# Patient Record
Sex: MALE | Race: ASIAN | NOT HISPANIC OR LATINO | URBAN - METROPOLITAN AREA
[De-identification: names, ages, dates, MRNs, and addresses within clinical notes are randomized per-mention and may not be internally consistent; named-entity substitution may affect disease eponyms.]

---

## 2020-07-10 ENCOUNTER — OUTPATIENT (OUTPATIENT)
Dept: OUTPATIENT SERVICES | Facility: HOSPITAL | Age: 55
LOS: 1 days | Discharge: HOME | End: 2020-07-10

## 2020-07-10 DIAGNOSIS — C79.9 SECONDARY MALIGNANT NEOPLASM OF UNSPECIFIED SITE: ICD-10-CM

## 2020-07-10 PROBLEM — R59.0 PELVIC LYMPHADENOPATHY: Status: ACTIVE | Noted: 2020-07-10

## 2020-07-10 PROBLEM — R59.0 RETROPERITONEAL LYMPHADENOPATHY: Status: ACTIVE | Noted: 2020-07-10

## 2020-07-13 ENCOUNTER — APPOINTMENT (OUTPATIENT)
Dept: HEMATOLOGY ONCOLOGY | Facility: CLINIC | Age: 55
End: 2020-07-13
Payer: COMMERCIAL

## 2020-07-13 VITALS
BODY MASS INDEX: 34.02 KG/M2 | DIASTOLIC BLOOD PRESSURE: 102 MMHG | TEMPERATURE: 99 F | SYSTOLIC BLOOD PRESSURE: 152 MMHG | HEART RATE: 78 BPM | HEIGHT: 64.02 IN | RESPIRATION RATE: 17 BRPM | WEIGHT: 199.3 LBS | OXYGEN SATURATION: 98 %

## 2020-07-13 DIAGNOSIS — Z56.0 UNEMPLOYMENT, UNSPECIFIED: ICD-10-CM

## 2020-07-13 DIAGNOSIS — Z84.89 FAMILY HISTORY OF OTHER SPECIFIED CONDITIONS: ICD-10-CM

## 2020-07-13 DIAGNOSIS — R59.0 LOCALIZED ENLARGED LYMPH NODES: ICD-10-CM

## 2020-07-13 DIAGNOSIS — R73.03 PREDIABETES.: ICD-10-CM

## 2020-07-13 DIAGNOSIS — Z87.891 PERSONAL HISTORY OF NICOTINE DEPENDENCE: ICD-10-CM

## 2020-07-13 DIAGNOSIS — Z83.3 FAMILY HISTORY OF DIABETES MELLITUS: ICD-10-CM

## 2020-07-13 DIAGNOSIS — Z87.442 PERSONAL HISTORY OF URINARY CALCULI: ICD-10-CM

## 2020-07-13 PROCEDURE — 99205 OFFICE O/P NEW HI 60 MIN: CPT

## 2020-07-13 SDOH — ECONOMIC STABILITY - INCOME SECURITY: UNEMPLOYMENT, UNSPECIFIED: Z56.0

## 2020-07-13 NOTE — RESULTS/DATA
[FreeTextEntry1] : CT scan is dated seven 8/20 July 8, 2020, from an outside radiology center.  This was compared to a CT of the abdomen and pelvis in January 2 of 2020.  There was diffusely hypoattenuating findings in the liver consistent with steatosis.  There are a couple small and simple hemorrhagic cysts were again noted bilaterally.  There was no enhancing renal mass.  There were several lymph nodes that had developed.  These are described as multiple scattered retroperitoneal and pelvic lymph nodes which are noted to be increased in size and number.  The prior scan did not list any adenopathy.  A left periaortic lymph node measures 1 x 1.8, previously 0.6 x 1.3.  Left common iliac lymph node now measures 1.9 x 1.5, previously 1.5 x 1.3.  A right internal iliac lymph node measures 2.4 x 1.2 and the left internal iliac lymph node measures 1.9 x 1 cm.  Left obturator lymph node measures 0.8 x 1.3 cm.  There is an enhancing lesion in the anterior fibromuscular stroma of the prostate measuring 0.7 x 1.5 cm.  There is also an enhancing lesion in the left posterior lateral peripheral zone base, measuring 0.6 x 1.2 cm.  Seminal vesicles were mostly obscured by artifact from a right hip arthroplasty.  There was a sclerotic lesion in the left iliac wing measuring 4 x 6 mm.  There was a 5 mm right sacral sclerotic lesion, most likely a bone island as it was present in 2007.  There are multiple degenerative changes of the spine.\par \par CT scan from January 2 of 2020 as much detail about the renal cysts.  It says there is no adenopathy present.  PSA is available from December 30 of 2009, with a value of 1.1.  On December 12 of 2018, the PSA was 1.28 within our system.

## 2020-07-13 NOTE — REVIEW OF SYSTEMS
[Fatigue] : fatigue [Recent Change In Weight] : ~T recent weight change [Negative] : ENT [Fever] : no fever [Chills] : no chills [Palpitations] : no palpitations [Chest Pain] : no chest pain [Lower Ext Edema] : no lower extremity edema [Cough] : no cough [Abdominal Pain] : no abdominal pain [SOB on Exertion] : no shortness of breath during exertion [Constipation] : no constipation [Vomiting] : no vomiting [Diarrhea] : no diarrhea [Incontinence] : no incontinence [Dysuria] : no dysuria [Joint Pain] : no joint pain [Muscle Pain] : no muscle pain [Joint Stiffness] : no joint stiffness [Skin Rash] : no skin rash [Anxiety] : no anxiety [Dizziness] : no dizziness [Depression] : no depression [Muscle Weakness] : no muscle weakness [Easy Bleeding] : no tendency for easy bleeding [Easy Bruising] : no tendency for easy bruising [FreeTextEntry9] : no cramps [FreeTextEntry2] : mild fatigue at times [de-identified] : occ HA, right occipital region, last 2 days, relieved with Tylenol no paresthesias

## 2020-07-13 NOTE — CONSULT LETTER
[Dear  ___] : Dear  [unfilled], [Consult Letter:] : I had the pleasure of evaluating your patient, [unfilled]. [Please see my note below.] : Please see my note below. [Consult Closing:] : Thank you very much for allowing me to participate in the care of this patient.  If you have any questions, please do not hesitate to contact me. [Sincerely,] : Sincerely, [DrFranco  ___] : Dr. LI

## 2020-07-13 NOTE — REASON FOR VISIT
[Initial Consultation] : an initial consultation [Family Member] : family member [FreeTextEntry2] : prostate lesion, lymphadenopathy

## 2020-07-13 NOTE — HISTORY OF PRESENT ILLNESS
[de-identified] : Cooper Mcfadden is seen in consultation on July 13, 2020. He does not have any symptoms. He had an abdominal sono last year. He had a CT scan in January and has multiple cysts. He wanted a repeat CT in 6 months, and this one shows adenopathy, new form prior scan. there is also a lesion within the prostate. He has no pains at all. Urine flow is normal, nocturia 1-2 times.  No dysuria, no hematuria. No hesitancy, no incontinence no urgency. No leg edema. Appetite is good, no weight loss.

## 2020-07-13 NOTE — PHYSICAL EXAM
[Fully active, able to carry on all pre-disease performance without restriction] : Status 0 - Fully active, able to carry on all pre-disease performance without restriction [Normal] : affect appropriate [de-identified] : no edema [de-identified] : no gynecomastia [FreeTextEntry1] : normal size, no nodules, no induration [de-identified] : uncircumcised, glans normal, testes normal.

## 2020-07-13 NOTE — ASSESSMENT
[Palliative Care Plan] : not applicable at this time [FreeTextEntry1] : Cooper Mcfadden is seen in the office today in consultation.  He is accompanied by his daughter, Zahira, who is a physician's assistant at [employer].  He had an abdominal sonogram performed last year, and this revealed the presence of cysts in the kidney and a CT scan was performed in January of this year.  The recommendation was to have a follow-up CT scan in 6 months, and this was recently performed.  This scan shows adenopathy.  The prior scan revealed no report of adenopathy, although there was some lymph nodes present that have enlarged at this time.  On the CT images, there are at least 2 lesions in the prostate, 1 of which is in the anterior fibromuscular stroma of the prostate measuring 7 x 15 mm.  There is also an enhancing lesion in the left posterior lateral peripheral zone base measuring 6 x 12 mm.  Seminal vesicles were obscured by artifact from his prior right hip arthroplasty.  There was a sclerotic lesion in the left iliac wing measuring 4 x 6 mm and a 5 mm right sacral sclerotic lesion which was deemed most likely to be a bone island as it was present in 2007 had a size of 3 mm.  No other bony lesions were noted within the CAT scan.\par \par An MRI of the prostate and the bone scan are due to be performed in the next 1 or 2 days.  His daughter was concerned about these findings, and sought to have a consultation.  He does not have an established diagnosis at this time.\par \par A comprehensive history was obtained and a physical examination was performed.  The rectal examination was suboptimal, but what I could determine in the lower portion as I could not palpate the more cephalad portion of the gland.  I did not find any nodularity or induration.\par \par We discussed many different things.  We cannot be certain that the lymph nodes are secondary to prostate cancer.  The MRI of the prostate will give us additional information to direct his biopsy.  He will also better determine whether or not there is capsular invasion or seminal vesicle invasion.  I requested that the images be downloaded onto our system from the outside radiology facility, but that has not been done as of yet.  He had a PSA performed at Dr. Tarango's office this past week, and the results are not known.  His daughter says that his PSA was somewhere between 1 and 2 in 2019.\par \par We discussed prostate cancer in general terms, the value of the [place] score in determining risk of spread and survival, and we discussed the common sites that disease spreads to.\par \par We discussed treatment options for metastatic prostate cancer if this indeed is what the final diagnosis is found to be.  We discussed the adverse effects that may occur with androgen deprivation therapy.\par \par She had multiple questions to ask, and many could not be answered without the necessary information at this point.  I have asked her to have the radiologic studies sent to me from the outside radiology facility when they are performed, and I will speak with her regarding these results.\par \par All questions were answered to the best of my ability and to their apparent satisfaction.  More information is needed in order to formulate a comprehensive plan of action.

## 2020-07-29 DIAGNOSIS — Z01.818 ENCOUNTER FOR OTHER PREPROCEDURAL EXAMINATION: ICD-10-CM

## 2020-08-01 ENCOUNTER — APPOINTMENT (OUTPATIENT)
Dept: DISASTER EMERGENCY | Facility: CLINIC | Age: 55
End: 2020-08-01

## 2020-08-02 LAB — SARS-COV-2 N GENE NPH QL NAA+PROBE: NOT DETECTED

## 2020-08-04 ENCOUNTER — OUTPATIENT (OUTPATIENT)
Dept: OUTPATIENT SERVICES | Facility: HOSPITAL | Age: 55
LOS: 1 days | End: 2020-08-04
Payer: COMMERCIAL

## 2020-08-04 ENCOUNTER — APPOINTMENT (OUTPATIENT)
Dept: CT IMAGING | Facility: HOSPITAL | Age: 55
End: 2020-08-04

## 2020-08-04 ENCOUNTER — RESULT REVIEW (OUTPATIENT)
Age: 55
End: 2020-08-04

## 2020-08-04 DIAGNOSIS — Z00.00 ENCOUNTER FOR GENERAL ADULT MEDICAL EXAMINATION WITHOUT ABNORMAL FINDINGS: ICD-10-CM

## 2020-08-04 DIAGNOSIS — R59.0 LOCALIZED ENLARGED LYMPH NODES: ICD-10-CM

## 2020-08-04 LAB
APTT BLD: 29.3 SEC — SIGNIFICANT CHANGE UP (ref 27.5–35.5)
HCT VFR BLD CALC: 41.2 % — SIGNIFICANT CHANGE UP (ref 39–50)
HGB BLD-MCNC: 13.3 G/DL — SIGNIFICANT CHANGE UP (ref 13–17)
INR BLD: 0.99 RATIO — SIGNIFICANT CHANGE UP (ref 0.88–1.16)
MCHC RBC-ENTMCNC: 27.5 PG — SIGNIFICANT CHANGE UP (ref 27–34)
MCHC RBC-ENTMCNC: 32.3 GM/DL — SIGNIFICANT CHANGE UP (ref 32–36)
MCV RBC AUTO: 85.3 FL — SIGNIFICANT CHANGE UP (ref 80–100)
NRBC # BLD: 0 /100 WBCS — SIGNIFICANT CHANGE UP (ref 0–0)
PLATELET # BLD AUTO: 213 K/UL — SIGNIFICANT CHANGE UP (ref 150–400)
PROTHROM AB SERPL-ACNC: 11.8 SEC — SIGNIFICANT CHANGE UP (ref 10.6–13.6)
RBC # BLD: 4.83 M/UL — SIGNIFICANT CHANGE UP (ref 4.2–5.8)
RBC # FLD: 13.1 % — SIGNIFICANT CHANGE UP (ref 10.3–14.5)
WBC # BLD: 7.58 K/UL — SIGNIFICANT CHANGE UP (ref 3.8–10.5)
WBC # FLD AUTO: 7.58 K/UL — SIGNIFICANT CHANGE UP (ref 3.8–10.5)

## 2020-08-04 PROCEDURE — 77012 CT SCAN FOR NEEDLE BIOPSY: CPT | Mod: 26

## 2020-08-04 PROCEDURE — 85730 THROMBOPLASTIN TIME PARTIAL: CPT

## 2020-08-04 PROCEDURE — 88305 TISSUE EXAM BY PATHOLOGIST: CPT

## 2020-08-04 PROCEDURE — 88185 FLOWCYTOMETRY/TC ADD-ON: CPT

## 2020-08-04 PROCEDURE — 87205 SMEAR GRAM STAIN: CPT

## 2020-08-04 PROCEDURE — 38505 NEEDLE BIOPSY LYMPH NODES: CPT

## 2020-08-04 PROCEDURE — 88172 CYTP DX EVAL FNA 1ST EA SITE: CPT

## 2020-08-04 PROCEDURE — 88184 FLOWCYTOMETRY/ TC 1 MARKER: CPT

## 2020-08-04 PROCEDURE — 77012 CT SCAN FOR NEEDLE BIOPSY: CPT

## 2020-08-04 PROCEDURE — 85610 PROTHROMBIN TIME: CPT

## 2020-08-04 PROCEDURE — 85027 COMPLETE CBC AUTOMATED: CPT

## 2020-08-04 PROCEDURE — 88173 CYTOPATH EVAL FNA REPORT: CPT | Mod: 26

## 2020-08-04 PROCEDURE — 88305 TISSUE EXAM BY PATHOLOGIST: CPT | Mod: 26

## 2020-08-04 PROCEDURE — 88189 FLOWCYTOMETRY/READ 16 & >: CPT

## 2020-08-04 PROCEDURE — 88173 CYTOPATH EVAL FNA REPORT: CPT

## 2020-08-05 LAB — TM INTERPRETATION: SIGNIFICANT CHANGE UP

## 2020-08-07 LAB — NON-GYNECOLOGICAL CYTOLOGY STUDY: SIGNIFICANT CHANGE UP

## 2022-07-27 ENCOUNTER — INPATIENT (INPATIENT)
Facility: HOSPITAL | Age: 57
LOS: 1 days | Discharge: HOME | DRG: 280 | End: 2022-07-29
Attending: STUDENT IN AN ORGANIZED HEALTH CARE EDUCATION/TRAINING PROGRAM | Admitting: STUDENT IN AN ORGANIZED HEALTH CARE EDUCATION/TRAINING PROGRAM
Payer: COMMERCIAL

## 2022-07-27 VITALS
HEART RATE: 88 BPM | WEIGHT: 190.04 LBS | HEIGHT: 64 IN | OXYGEN SATURATION: 98 % | RESPIRATION RATE: 18 BRPM | DIASTOLIC BLOOD PRESSURE: 101 MMHG | TEMPERATURE: 98 F | SYSTOLIC BLOOD PRESSURE: 187 MMHG

## 2022-07-27 DIAGNOSIS — I21.4 NON-ST ELEVATION (NSTEMI) MYOCARDIAL INFARCTION: ICD-10-CM

## 2022-07-27 DIAGNOSIS — Z29.9 ENCOUNTER FOR PROPHYLACTIC MEASURES, UNSPECIFIED: ICD-10-CM

## 2022-07-27 DIAGNOSIS — E78.5 HYPERLIPIDEMIA, UNSPECIFIED: ICD-10-CM

## 2022-07-27 DIAGNOSIS — I25.10 ATHEROSCLEROTIC HEART DISEASE OF NATIVE CORONARY ARTERY WITHOUT ANGINA PECTORIS: ICD-10-CM

## 2022-07-27 DIAGNOSIS — I10 ESSENTIAL (PRIMARY) HYPERTENSION: ICD-10-CM

## 2022-07-27 LAB
ALBUMIN SERPL ELPH-MCNC: 3.9 G/DL — SIGNIFICANT CHANGE UP (ref 3.3–5)
ALP SERPL-CCNC: 67 U/L — SIGNIFICANT CHANGE UP (ref 40–120)
ALT FLD-CCNC: 30 U/L — SIGNIFICANT CHANGE UP (ref 12–78)
ANION GAP SERPL CALC-SCNC: 7 MMOL/L — SIGNIFICANT CHANGE UP (ref 5–17)
APTT BLD: 31.1 SEC — SIGNIFICANT CHANGE UP (ref 27.5–35.5)
AST SERPL-CCNC: 25 U/L — SIGNIFICANT CHANGE UP (ref 15–37)
BILIRUB SERPL-MCNC: 0.5 MG/DL — SIGNIFICANT CHANGE UP (ref 0.2–1.2)
BUN SERPL-MCNC: 12 MG/DL — SIGNIFICANT CHANGE UP (ref 7–23)
CALCIUM SERPL-MCNC: 9 MG/DL — SIGNIFICANT CHANGE UP (ref 8.5–10.1)
CHLORIDE SERPL-SCNC: 105 MMOL/L — SIGNIFICANT CHANGE UP (ref 96–108)
CK MB BLD-MCNC: 1.2 % — SIGNIFICANT CHANGE UP (ref 0–3.5)
CK MB CFR SERPL CALC: 1.2 NG/ML — SIGNIFICANT CHANGE UP (ref 0–3.6)
CK MB CFR SERPL CALC: 1.5 NG/ML — SIGNIFICANT CHANGE UP (ref 0–3.6)
CK SERPL-CCNC: 102 U/L — SIGNIFICANT CHANGE UP (ref 26–308)
CO2 SERPL-SCNC: 27 MMOL/L — SIGNIFICANT CHANGE UP (ref 22–31)
CREAT SERPL-MCNC: 0.85 MG/DL — SIGNIFICANT CHANGE UP (ref 0.5–1.3)
EGFR: 101 ML/MIN/1.73M2 — SIGNIFICANT CHANGE UP
GLUCOSE SERPL-MCNC: 125 MG/DL — HIGH (ref 70–99)
HCT VFR BLD CALC: 41.9 % — SIGNIFICANT CHANGE UP (ref 39–50)
HGB BLD-MCNC: 13.5 G/DL — SIGNIFICANT CHANGE UP (ref 13–17)
INR BLD: 1.12 RATIO — SIGNIFICANT CHANGE UP (ref 0.88–1.16)
MCHC RBC-ENTMCNC: 27 PG — SIGNIFICANT CHANGE UP (ref 27–34)
MCHC RBC-ENTMCNC: 32.2 GM/DL — SIGNIFICANT CHANGE UP (ref 32–36)
MCV RBC AUTO: 83.8 FL — SIGNIFICANT CHANGE UP (ref 80–100)
NRBC # BLD: 0 /100 WBCS — SIGNIFICANT CHANGE UP (ref 0–0)
PLATELET # BLD AUTO: 326 K/UL — SIGNIFICANT CHANGE UP (ref 150–400)
POTASSIUM SERPL-MCNC: 3.8 MMOL/L — SIGNIFICANT CHANGE UP (ref 3.5–5.3)
POTASSIUM SERPL-SCNC: 3.8 MMOL/L — SIGNIFICANT CHANGE UP (ref 3.5–5.3)
PROT SERPL-MCNC: 8.1 G/DL — SIGNIFICANT CHANGE UP (ref 6–8.3)
PROTHROM AB SERPL-ACNC: 13.1 SEC — SIGNIFICANT CHANGE UP (ref 10.5–13.4)
RBC # BLD: 5 M/UL — SIGNIFICANT CHANGE UP (ref 4.2–5.8)
RBC # FLD: 12.9 % — SIGNIFICANT CHANGE UP (ref 10.3–14.5)
SODIUM SERPL-SCNC: 139 MMOL/L — SIGNIFICANT CHANGE UP (ref 135–145)
TROPONIN I, HIGH SENSITIVITY RESULT: 27.2 NG/L — SIGNIFICANT CHANGE UP
TROPONIN I, HIGH SENSITIVITY RESULT: 95.3 NG/L — HIGH
WBC # BLD: 11.64 K/UL — HIGH (ref 3.8–10.5)
WBC # FLD AUTO: 11.64 K/UL — HIGH (ref 3.8–10.5)

## 2022-07-27 PROCEDURE — 99285 EMERGENCY DEPT VISIT HI MDM: CPT

## 2022-07-27 PROCEDURE — 99223 1ST HOSP IP/OBS HIGH 75: CPT

## 2022-07-27 PROCEDURE — 93010 ELECTROCARDIOGRAM REPORT: CPT

## 2022-07-27 PROCEDURE — 71045 X-RAY EXAM CHEST 1 VIEW: CPT | Mod: 26

## 2022-07-27 RX ORDER — METOPROLOL TARTRATE 50 MG
50 TABLET ORAL DAILY
Refills: 0 | Status: DISCONTINUED | OUTPATIENT
Start: 2022-07-27 | End: 2022-07-29

## 2022-07-27 RX ORDER — ASPIRIN/CALCIUM CARB/MAGNESIUM 324 MG
81 TABLET ORAL DAILY
Refills: 0 | Status: DISCONTINUED | OUTPATIENT
Start: 2022-07-27 | End: 2022-07-29

## 2022-07-27 RX ORDER — ACETAMINOPHEN 500 MG
650 TABLET ORAL EVERY 6 HOURS
Refills: 0 | Status: DISCONTINUED | OUTPATIENT
Start: 2022-07-27 | End: 2022-07-29

## 2022-07-27 RX ORDER — LANOLIN ALCOHOL/MO/W.PET/CERES
3 CREAM (GRAM) TOPICAL AT BEDTIME
Refills: 0 | Status: DISCONTINUED | OUTPATIENT
Start: 2022-07-27 | End: 2022-07-29

## 2022-07-27 RX ORDER — ASPIRIN/CALCIUM CARB/MAGNESIUM 324 MG
1 TABLET ORAL
Qty: 0 | Refills: 0 | DISCHARGE

## 2022-07-27 RX ORDER — HEPARIN SODIUM 5000 [USP'U]/ML
5000 INJECTION INTRAVENOUS; SUBCUTANEOUS ONCE
Refills: 0 | Status: COMPLETED | OUTPATIENT
Start: 2022-07-27 | End: 2022-07-27

## 2022-07-27 RX ORDER — HEPARIN SODIUM 5000 [USP'U]/ML
5300 INJECTION INTRAVENOUS; SUBCUTANEOUS EVERY 6 HOURS
Refills: 0 | Status: DISCONTINUED | OUTPATIENT
Start: 2022-07-27 | End: 2022-07-29

## 2022-07-27 RX ORDER — METOPROLOL TARTRATE 50 MG
1 TABLET ORAL
Qty: 0 | Refills: 0 | DISCHARGE

## 2022-07-27 RX ORDER — LOSARTAN POTASSIUM 100 MG/1
1 TABLET, FILM COATED ORAL
Qty: 0 | Refills: 0 | DISCHARGE

## 2022-07-27 RX ORDER — ASPIRIN/CALCIUM CARB/MAGNESIUM 324 MG
325 TABLET ORAL ONCE
Refills: 0 | Status: COMPLETED | OUTPATIENT
Start: 2022-07-27 | End: 2022-07-27

## 2022-07-27 RX ORDER — ATORVASTATIN CALCIUM 80 MG/1
80 TABLET, FILM COATED ORAL AT BEDTIME
Refills: 0 | Status: DISCONTINUED | OUTPATIENT
Start: 2022-07-27 | End: 2022-07-29

## 2022-07-27 RX ORDER — HEPARIN SODIUM 5000 [USP'U]/ML
INJECTION INTRAVENOUS; SUBCUTANEOUS
Qty: 25000 | Refills: 0 | Status: DISCONTINUED | OUTPATIENT
Start: 2022-07-27 | End: 2022-07-29

## 2022-07-27 RX ORDER — LOSARTAN POTASSIUM 100 MG/1
50 TABLET, FILM COATED ORAL DAILY
Refills: 0 | Status: DISCONTINUED | OUTPATIENT
Start: 2022-07-27 | End: 2022-07-29

## 2022-07-27 RX ADMIN — HEPARIN SODIUM 5000 UNIT(S): 5000 INJECTION INTRAVENOUS; SUBCUTANEOUS at 22:17

## 2022-07-27 RX ADMIN — Medication 325 MILLIGRAM(S): at 19:31

## 2022-07-27 RX ADMIN — HEPARIN SODIUM 1000 UNIT(S)/HR: 5000 INJECTION INTRAVENOUS; SUBCUTANEOUS at 22:17

## 2022-07-27 NOTE — H&P ADULT - HISTORY OF PRESENT ILLNESS
56-year-old male with past medical history of CAD with stents on aspirin(stents 2009), Hypertension, hyperlipidemia who presents with compliant of chest pain. Patient states he had constant left-sided chest pain that started at 5 PM while sitting on sofa at home.  Patient states pain was radiating down arm and neck.  Patient also complaining of some shortness of breath.  Patient denies worsening symptoms with exertion. Patient states pain has resolved upon interview.     In the ed   VS: T 98.2, HR 88, /101, RR 18, SpO2 98 on RA  Labs: WBC 11.94, troponin 27.2->95.3, glucose 125  EKG: NSR 86  CXR: clear lungs on wet read  Given asa 325, started on heparin gtt per cardiology Dr. Hankins

## 2022-07-27 NOTE — ED ADULT NURSE NOTE - NSIMPLEMENTINTERV_GEN_ALL_ED
Implemented All Fall Risk Interventions:  [place] to call system. Call bell, personal items and telephone within reach. Instruct patient to call for assistance. Room bathroom lighting operational. Non-slip footwear when patient is off stretcher. Physically safe environment: no spills, clutter or unnecessary equipment. Stretcher in lowest position, wheels locked, appropriate side rails in place. Provide visual cue, wrist band, yellow gown, etc. Monitor gait and stability. Monitor for mental status changes and reorient to person, place, and time. Review medications for side effects contributing to fall risk. Reinforce activity limits and safety measures with patient and family.

## 2022-07-27 NOTE — ED ADULT NURSE NOTE - OBJECTIVE STATEMENT
received pt c/o midsternal cp radiating to L arm for ~ 1 hour prior to ed arrival.  pt staes he was sitting prior to onset, denies recent change in medications/activity, denies recent trauma.   Respirations even and unlabored denies sob.   Skin warm and dry.   ekg obtained.  Pt safety maintained. BN

## 2022-07-27 NOTE — ED PROVIDER NOTE - PROGRESS NOTE DETAILS
2nd trop positive will admit for nstemi given heparin cardio Dr. Hankins made aware currently chest pain free

## 2022-07-27 NOTE — H&P ADULT - TIME BILLING
Home note written by attending, see above   Reviewing medical record including consultant recommendations, labs, vitals, medications, orders, imaging, and discussion of plan of care with patient.

## 2022-07-27 NOTE — H&P ADULT - NSHPREVIEWOFSYSTEMS_GEN_ALL_CORE
CONSTITUTIONAL: denies fever, chills, fatigue, weakness  HEENT: denies blurred vision, sore throat  SKIN: denies new lesions, rash  CARDIOVASCULAR: admits chest pain, chest pressure, denies palpitations  RESPIRATORY: admits mild shortness of breath, denies sputum production  GASTROINTESTINAL: denies nausea, vomiting, diarrhea, abdominal pain  GENITOURINARY: denies dysuria, discharge  NEUROLOGICAL: denies numbness, headache, focal weakness  MUSCULOSKELETAL: denies new joint pain, muscle aches  HEMATOLOGIC: denies gross bleeding, bruising  LYMPHATICS: denies enlarged lymph nodes, extremity swelling  PSYCHIATRIC: denies recent changes in anxiety, depression  ENDOCRINOLOGIC: denies sweating, cold or heat intolerance

## 2022-07-27 NOTE — H&P ADULT - ASSESSMENT
56-year-old male with past medical history of CAD with stents on aspirin(stents 2009), Hypertension, hyperlipidemia who presents with compliant of chest pain. Admitted with NSTEMI.

## 2022-07-27 NOTE — ED PROVIDER NOTE - CLINICAL SUMMARY MEDICAL DECISION MAKING FREE TEXT BOX
DT: I have personally performed a face to face diagnostic evaluation on this patient.  I have reviewed the PA's note and agree with the history, exam, and plan of care, except as noted.  History and Exam by me shows 56-year-old male with past medical history of CAD with stents and Hypertension hyperlipidemia on aspirin complaining of constant left-sided chest pain that started at 5 PM while sitting on sofa at home.  Patient states pain radiating arm and neck.  Patient also complaining of some shortness of breath.  Patient denies worsening symptoms with exertion..  Patient is NAD.  A n O x 3. Head NC/AT. Lungs cta bl. Heart s1,s2, rrr, no murmurs. Abd-soft, nt, no guarding, no rebound, no distension, no cva tenderness. Ext- FROM actively,  ambulating s any difficulty.  Labs and cxr were sig for nstemi, but pt has no cp during me eval.

## 2022-07-27 NOTE — H&P ADULT - PROBLEM SELECTOR PLAN 1
- Admit to tele  - Trop I elevated, EKG as above  - Continue heparin gtt  - continue home asa, interchange for home statin, home bb  - trend troponin to peak   - F/U AM lipid panel  - Echo  - DASH diet  - Cardiology Dr. Hankins consulted

## 2022-07-27 NOTE — ED PROVIDER NOTE - NS ED ATTENDING STATEMENT MOD
This was a shared visit with the KATHRYN. I reviewed and verified the documentation and independently performed the documented:

## 2022-07-27 NOTE — H&P ADULT - NSHPPHYSICALEXAM_GEN_ALL_CORE
T(C): 36.8 (07-27-22 @ 17:36), Max: 36.8 (07-27-22 @ 17:36)  HR: 88 (07-27-22 @ 17:36) (88 - 88)  BP: 187/101 (07-27-22 @ 17:36) (187/101 - 187/101)  RR: 18 (07-27-22 @ 17:36) (18 - 18)  SpO2: 98% (07-27-22 @ 17:36) (98% - 98%)    GENERAL: patient appears well, no acute distress, appropriate, pleasant  EYES: sclera clear, no exudates  ENMT: oropharynx clear without erythema, no exudates, moist mucous membranes  NECK: supple, soft, no thyromegaly noted  LUNGS: good air entry bilaterally, clear to auscultation, symmetric breath sounds, no wheezing or rhonchi appreciated  HEART: soft S1/S2, regular rate and rhythm, no murmurs noted, no lower extremity edema  GASTROINTESTINAL: abdomen is soft, nontender, nondistended, normoactive bowel sounds, no palpable masses  INTEGUMENT: good skin turgor, no lesions noted  MUSCULOSKELETAL: no clubbing or cyanosis, no obvious deformity  NEUROLOGIC: awake, alert, oriented x3, good muscle tone in 4 extremities, no obvious sensory deficits  PSYCHIATRIC: mood is good, affect is congruent, linear and logical thought process  HEME/LYMPH: no palpable supraclavicular nodules, no obvious ecchymosis or petechiae

## 2022-07-27 NOTE — ED PROVIDER NOTE - OBJECTIVE STATEMENT
Pt is a 56-year-old male with past medical history of CAD with stents and Hypertension hyperlipidemia on aspirin complaining of constant left-sided chest pain that started at 5 PM while sitting on sofa at home.  Patient states pain radiating arm and neck.  Patient also complaining of some shortness of breath.  Patient denies worsening symptoms with exertion.    pcp angel peraza  cardio [place]

## 2022-07-27 NOTE — ED PROVIDER NOTE - TOBACCO USE
Admission Unknown if ever smoked Spine appears normal, range of motion is not limited, no muscle or joint tenderness

## 2022-07-27 NOTE — ED ADULT NURSE NOTE - NS PRO PASSIVE SMOKE EXP
Patient Information     Patient Name MRN Sex Radha Ricci [id number] Female 1975      Progress Notes by Shaun Mc MD at 2017 11:15 AM     Author:  Shaun Mc MD Service:  (none) Author Type:  Physician     Filed:  2017 11:39 AM Encounter Date:  2017 Status:  Signed     :  Shaun Mc MD (Physician)            Radha Ruiz presents today for a postop checkup at 6 weeks after vaginal vault prolapse and rectocele repair    S: Bowels and bladder are functioning normally, no abnormal bleeding or pain. No concerns about the incision(s).  She notes a new sensation with defecation, not painful.    Current Outpatient Prescriptions on File Prior to Visit       Medication  Sig Dispense Refill     cetirizine-pseudoephedrine, 5-120 mg, (ZYRTEC-D) 5-120 mg tablet TAKE 1 TABLET BY MOUTH TWICE DAILY 48 tablet 5     fluticasone (50 mcg per actuation) nasal solution (FLONASE) INHALE 2 SPRAYS INTO BOTH NOSTRILS ONCE DAILY. 3 Bottle 11     ibuprofen (ADVIL; MOTRIN) 200 mg tablet Take 1-3 tablets by mouth every 6 hours if needed for Pain. 100 tablet 0     linaclotide (LINZESS) 145 mcg cap capsule Take 1 capsule by mouth before breakfast.  0     traZODone (DESYREL) 100 mg tablet TAKE 3-4 TABLETS BY MOUTH AT BEDTIME. 360 tablet 3     venlafaxine (EFFEXOR XR) 150 mg Extended-Release capsule Take 1 capsule by mouth once daily with a meal. 90 capsule 3     No current facility-administered medications on file prior to visit.      Allergies     Allergen  Reactions     Sulfa (Sulfonamide Antibiotics) Hives     Past Medical History:     Diagnosis  Date     History of blood transfusion 10/00    History of blood transfusion with       Hx of pregnancy      2, para 1-0-1-1      Pulmonary emboli (HC)     History of pulmonary emboli after       Routine gynecological examination 08    Satisfactory GYN examination       Past Surgical History:      Procedure   Laterality Date      SECTION  10/00 /4/18/06    History of blood transfusion with        COLONOSCOPY DIAGNOSTIC  ,,    F/U 2019       CRYOTHERAPY OF CERVIX  1996    Cryosurgery of the cervix        [facility]      Ney Hart MD        ND COMBO ANT/POST COLPORR W ENTEROCELE  2017            TUBAL LIGATION  2006       COMPLETE REVIEW OF SYSTEMS: see HPI        O: /78 (Cuff Site: Right Arm, Position: Sitting, Cuff Size: Adult Large)  Pulse 82  Wt 74.8 kg (164 lb 12.8 oz)  LMP 10/11/2012  BMI 30.14 kg/m2 Body mass index is 30.14 kg/(m^2).    EXAM:  General Appearance: Pleasant, alert, appropriate appearance for age. No acute distress  Genitourinary Exam Female: Vagina:  vaginal vault well positioned, small granuloma treated with silver nitrate, no rectocele or cystocele.      I/P:  Stable postop, resume normal activity, f/u prn.  D/c lovenox.    Based on what occurred in the visit today:  Previous medication(s) were discontinued or altered? No  Previous medication(s) were suspended pending consultation? No  New medication(s) started? No        Shaun Mc MD FACOG  11:36 AM 2017                Unknown

## 2022-07-28 ENCOUNTER — TRANSCRIPTION ENCOUNTER (OUTPATIENT)
Age: 57
End: 2022-07-28

## 2022-07-28 LAB
A1C WITH ESTIMATED AVERAGE GLUCOSE RESULT: 6.3 % — HIGH (ref 4–5.6)
ALBUMIN SERPL ELPH-MCNC: 3.7 G/DL — SIGNIFICANT CHANGE UP (ref 3.3–5)
ALP SERPL-CCNC: 67 U/L — SIGNIFICANT CHANGE UP (ref 40–120)
ALT FLD-CCNC: 32 U/L — SIGNIFICANT CHANGE UP (ref 12–78)
ANION GAP SERPL CALC-SCNC: 4 MMOL/L — LOW (ref 5–17)
APTT BLD: 60.5 SEC — HIGH (ref 27.5–35.5)
APTT BLD: 64.5 SEC — HIGH (ref 27.5–35.5)
AST SERPL-CCNC: 35 U/L — SIGNIFICANT CHANGE UP (ref 15–37)
BASOPHILS # BLD AUTO: 0.03 K/UL — SIGNIFICANT CHANGE UP (ref 0–0.2)
BASOPHILS NFR BLD AUTO: 0.3 % — SIGNIFICANT CHANGE UP (ref 0–2)
BILIRUB SERPL-MCNC: 0.6 MG/DL — SIGNIFICANT CHANGE UP (ref 0.2–1.2)
BUN SERPL-MCNC: 12 MG/DL — SIGNIFICANT CHANGE UP (ref 7–23)
CALCIUM SERPL-MCNC: 9.3 MG/DL — SIGNIFICANT CHANGE UP (ref 8.5–10.1)
CHLORIDE SERPL-SCNC: 105 MMOL/L — SIGNIFICANT CHANGE UP (ref 96–108)
CHOLEST SERPL-MCNC: 206 MG/DL — HIGH
CK MB BLD-MCNC: 6.5 % — HIGH (ref 0–3.5)
CK MB BLD-MCNC: 8.3 % — HIGH (ref 0–3.5)
CK MB BLD-MCNC: 8.4 % — HIGH (ref 0–3.5)
CK MB BLD-MCNC: 8.7 % — HIGH (ref 0–3.5)
CK MB CFR SERPL CALC: 11.4 NG/ML — HIGH (ref 0–3.6)
CK MB CFR SERPL CALC: 12.2 NG/ML — HIGH (ref 0–3.6)
CK MB CFR SERPL CALC: 13.8 NG/ML — HIGH (ref 0–3.6)
CK MB CFR SERPL CALC: 7.7 NG/ML — HIGH (ref 0–3.6)
CK SERPL-CCNC: 119 U/L — SIGNIFICANT CHANGE UP (ref 26–308)
CK SERPL-CCNC: 135 U/L — SIGNIFICANT CHANGE UP (ref 26–308)
CK SERPL-CCNC: 147 U/L — SIGNIFICANT CHANGE UP (ref 26–308)
CK SERPL-CCNC: 158 U/L — SIGNIFICANT CHANGE UP (ref 26–308)
CO2 SERPL-SCNC: 30 MMOL/L — SIGNIFICANT CHANGE UP (ref 22–31)
CREAT SERPL-MCNC: 0.78 MG/DL — SIGNIFICANT CHANGE UP (ref 0.5–1.3)
EGFR: 103 ML/MIN/1.73M2 — SIGNIFICANT CHANGE UP
EOSINOPHIL # BLD AUTO: 0.2 K/UL — SIGNIFICANT CHANGE UP (ref 0–0.5)
EOSINOPHIL NFR BLD AUTO: 2.2 % — SIGNIFICANT CHANGE UP (ref 0–6)
ESTIMATED AVERAGE GLUCOSE: 134 MG/DL — HIGH (ref 68–114)
GLUCOSE SERPL-MCNC: 103 MG/DL — HIGH (ref 70–99)
HCT VFR BLD CALC: 42 % — SIGNIFICANT CHANGE UP (ref 39–50)
HCV AB S/CO SERPL IA: 0.07 S/CO — SIGNIFICANT CHANGE UP (ref 0–0.99)
HCV AB SERPL-IMP: SIGNIFICANT CHANGE UP
HDLC SERPL-MCNC: 48 MG/DL — SIGNIFICANT CHANGE UP
HGB BLD-MCNC: 13.2 G/DL — SIGNIFICANT CHANGE UP (ref 13–17)
IMM GRANULOCYTES NFR BLD AUTO: 0.4 % — SIGNIFICANT CHANGE UP (ref 0–1.5)
LIPID PNL WITH DIRECT LDL SERPL: 128 MG/DL — HIGH
LYMPHOCYTES # BLD AUTO: 3.64 K/UL — HIGH (ref 1–3.3)
LYMPHOCYTES # BLD AUTO: 40 % — SIGNIFICANT CHANGE UP (ref 13–44)
MCHC RBC-ENTMCNC: 26.4 PG — LOW (ref 27–34)
MCHC RBC-ENTMCNC: 31.4 GM/DL — LOW (ref 32–36)
MCV RBC AUTO: 84 FL — SIGNIFICANT CHANGE UP (ref 80–100)
MONOCYTES # BLD AUTO: 0.51 K/UL — SIGNIFICANT CHANGE UP (ref 0–0.9)
MONOCYTES NFR BLD AUTO: 5.6 % — SIGNIFICANT CHANGE UP (ref 2–14)
NEUTROPHILS # BLD AUTO: 4.69 K/UL — SIGNIFICANT CHANGE UP (ref 1.8–7.4)
NEUTROPHILS NFR BLD AUTO: 51.5 % — SIGNIFICANT CHANGE UP (ref 43–77)
NON HDL CHOLESTEROL: 158 MG/DL — HIGH
NRBC # BLD: 0 /100 WBCS — SIGNIFICANT CHANGE UP (ref 0–0)
PLATELET # BLD AUTO: 304 K/UL — SIGNIFICANT CHANGE UP (ref 150–400)
POTASSIUM SERPL-MCNC: 4.5 MMOL/L — SIGNIFICANT CHANGE UP (ref 3.5–5.3)
POTASSIUM SERPL-SCNC: 4.5 MMOL/L — SIGNIFICANT CHANGE UP (ref 3.5–5.3)
PROT SERPL-MCNC: 7.9 G/DL — SIGNIFICANT CHANGE UP (ref 6–8.3)
RBC # BLD: 5 M/UL — SIGNIFICANT CHANGE UP (ref 4.2–5.8)
RBC # FLD: 12.8 % — SIGNIFICANT CHANGE UP (ref 10.3–14.5)
SARS-COV-2 RNA SPEC QL NAA+PROBE: DETECTED
SODIUM SERPL-SCNC: 139 MMOL/L — SIGNIFICANT CHANGE UP (ref 135–145)
T4 AB SER-ACNC: 9.2 UG/DL — SIGNIFICANT CHANGE UP (ref 4.6–12)
TRIGL SERPL-MCNC: 151 MG/DL — HIGH
TROPONIN I, HIGH SENSITIVITY RESULT: 1373.1 NG/L — HIGH
TROPONIN I, HIGH SENSITIVITY RESULT: 1626.8 NG/L — HIGH
TROPONIN I, HIGH SENSITIVITY RESULT: 1707.3 NG/L — HIGH
TROPONIN I, HIGH SENSITIVITY RESULT: 555.8 NG/L — HIGH
TSH SERPL-MCNC: 1.48 UIU/ML — SIGNIFICANT CHANGE UP (ref 0.36–3.74)
WBC # BLD: 9.11 K/UL — SIGNIFICANT CHANGE UP (ref 3.8–10.5)
WBC # FLD AUTO: 9.11 K/UL — SIGNIFICANT CHANGE UP (ref 3.8–10.5)

## 2022-07-28 PROCEDURE — 93306 TTE W/DOPPLER COMPLETE: CPT | Mod: 26

## 2022-07-28 PROCEDURE — 93010 ELECTROCARDIOGRAM REPORT: CPT

## 2022-07-28 PROCEDURE — 99291 CRITICAL CARE FIRST HOUR: CPT

## 2022-07-28 PROCEDURE — 99233 SBSQ HOSP IP/OBS HIGH 50: CPT | Mod: GC

## 2022-07-28 RX ORDER — CLOPIDOGREL BISULFATE 75 MG/1
300 TABLET, FILM COATED ORAL ONCE
Refills: 0 | Status: COMPLETED | OUTPATIENT
Start: 2022-07-28 | End: 2022-07-28

## 2022-07-28 RX ORDER — CLOPIDOGREL BISULFATE 75 MG/1
75 TABLET, FILM COATED ORAL DAILY
Refills: 0 | Status: DISCONTINUED | OUTPATIENT
Start: 2022-07-29 | End: 2022-07-29

## 2022-07-28 RX ADMIN — ATORVASTATIN CALCIUM 80 MILLIGRAM(S): 80 TABLET, FILM COATED ORAL at 22:12

## 2022-07-28 RX ADMIN — Medication 50 MILLIGRAM(S): at 05:59

## 2022-07-28 RX ADMIN — CLOPIDOGREL BISULFATE 300 MILLIGRAM(S): 75 TABLET, FILM COATED ORAL at 14:37

## 2022-07-28 RX ADMIN — CLOPIDOGREL BISULFATE 300 MILLIGRAM(S): 75 TABLET, FILM COATED ORAL at 12:38

## 2022-07-28 RX ADMIN — HEPARIN SODIUM 1000 UNIT(S)/HR: 5000 INJECTION INTRAVENOUS; SUBCUTANEOUS at 07:28

## 2022-07-28 RX ADMIN — HEPARIN SODIUM 1000 UNIT(S)/HR: 5000 INJECTION INTRAVENOUS; SUBCUTANEOUS at 22:15

## 2022-07-28 RX ADMIN — HEPARIN SODIUM 1000 UNIT(S)/HR: 5000 INJECTION INTRAVENOUS; SUBCUTANEOUS at 12:23

## 2022-07-28 RX ADMIN — Medication 81 MILLIGRAM(S): at 12:35

## 2022-07-28 RX ADMIN — HEPARIN SODIUM 1000 UNIT(S)/HR: 5000 INJECTION INTRAVENOUS; SUBCUTANEOUS at 05:28

## 2022-07-28 RX ADMIN — LOSARTAN POTASSIUM 50 MILLIGRAM(S): 100 TABLET, FILM COATED ORAL at 05:58

## 2022-07-28 NOTE — DISCHARGE NOTE PROVIDER - NSDCCPCAREPLAN_GEN_ALL_CORE_FT
PRINCIPAL DISCHARGE DIAGNOSIS  Diagnosis: NSTEMI (non-ST elevation myocardial infarction)  Assessment and Plan of Treatment: You were diagnosed with a type of heart attack known as an NSTEMI. You were treated with a blood thinner called heparin, and your symptoms improved.      SECONDARY DISCHARGE DIAGNOSES  Diagnosis: CAD (coronary artery disease)  Assessment and Plan of Treatment: You were previously diagnosed with coronary artery disease. This is a narrowing of the arteries in your heart. Please continue to take your medications as prescribed and follow up with your PCP and cardiologist for further monitoring.      Diagnosis: HLD (hyperlipidemia)  Assessment and Plan of Treatment: Hyperlipidemia, or high cholesterol, is a condition where the levels of fat and/or cholesterol in your blood are high. These elevated blood levels are dangerous because they can lead to issues like heart attacks. It is important that when you leave the hospital you take all your medications as prescribed to control your blood cholesterol levels.      Diagnosis: HTN (hypertension)  Assessment and Plan of Treatment: You were previously diagnosed with high blood pressure. Please continue on your home medications at this time and follow up with your PMD for further surveillance and management of your high blood pressure.       PRINCIPAL DISCHARGE DIAGNOSIS  Diagnosis: NSTEMI (non-ST elevation myocardial infarction)  Assessment and Plan of Treatment: You were diagnosed with a type of heart attack known as an NSTEMI. You were treated with a blood thinner called heparin and plavix your symptoms improved.      SECONDARY DISCHARGE DIAGNOSES  Diagnosis: CAD (coronary artery disease)  Assessment and Plan of Treatment: You were previously diagnosed with coronary artery disease. This is a narrowing of the arteries in your heart. Please continue to take your medications as prescribed and follow up with your PCP and cardiologist for further monitoring.      Diagnosis: HTN (hypertension)  Assessment and Plan of Treatment: You were previously diagnosed with high blood pressure. Please continue on your home medications at this time and follow up with your PMD for further surveillance and management of your high blood pressure.      Diagnosis: HLD (hyperlipidemia)  Assessment and Plan of Treatment: Hyperlipidemia, or high cholesterol, is a condition where the levels of fat and/or cholesterol in your blood are high. These elevated blood levels are dangerous because they can lead to issues like heart attacks. It is important that when you leave the hospital you take all your medications as prescribed to control your blood cholesterol levels.       PRINCIPAL DISCHARGE DIAGNOSIS  Diagnosis: NSTEMI (non-ST elevation myocardial infarction)  Assessment and Plan of Treatment: You were diagnosed with a type of heart attack known as an NSTEMI. You were treated with a blood thinner called heparin and plavix your symptoms improved.   PLEASE START TAKING PLAVIX 75mg EVERYDAY      SECONDARY DISCHARGE DIAGNOSES  Diagnosis: CAD (coronary artery disease)  Assessment and Plan of Treatment: You were previously diagnosed with coronary artery disease. This is a narrowing of the arteries in your heart. Please follow up with your PCP and cardiologist for further monitoring.  PLEASE START TAKING LIPITOR       Diagnosis: HLD (hyperlipidemia)  Assessment and Plan of Treatment: Hyperlipidemia, or high cholesterol, is a condition where the levels of fat and/or cholesterol in your blood are high. These elevated blood levels are dangerous because they can lead to issues like heart attacks. It is important that when you leave the hospital you take all your medications as prescribed to control your blood cholesterol levels.  PLEASE START TAKING LIPITOR       Diagnosis: HTN (hypertension)  Assessment and Plan of Treatment: You were previously diagnosed with high blood pressure. Please continue on your home medications at this time and follow up with your PMD for further surveillance and management of your high blood pressure.   PLEASE CONTINUE TAKING LOSARTAN AND METOPROLOL        PRINCIPAL DISCHARGE DIAGNOSIS  Diagnosis: NSTEMI (non-ST elevation myocardial infarction)  Assessment and Plan of Treatment: You were diagnosed with a type of heart attack known as an NSTEMI. You were treated with a blood thinner called heparin and plavix your symptoms improved.   PLEASE START TAKING PLAVIX 75mg EVERYDAY      SECONDARY DISCHARGE DIAGNOSES  Diagnosis: CAD (coronary artery disease)  Assessment and Plan of Treatment: You were previously diagnosed with coronary artery disease. This is a narrowing of the arteries in your heart. Please follow up with your PCP and cardiologist for further monitoring. We have changed your medications.   PLEASE CONTINUE TAKING CRESTOR       Diagnosis: HLD (hyperlipidemia)  Assessment and Plan of Treatment: Hyperlipidemia, or high cholesterol, is a condition where the levels of fat and/or cholesterol in your blood are high. These elevated blood levels are dangerous because they can lead to issues like heart attacks. It is important that when you leave the hospital you take all your medications as prescribed to control your blood cholesterol levels.  PLEASE CONTINUE TAKING CRESTOR    Diagnosis: HTN (hypertension)  Assessment and Plan of Treatment: You were previously diagnosed with high blood pressure. Please continue on your home medications at this time and follow up with your PMD for further surveillance and management of your high blood pressure.   PLEASE CONTINUE TAKING LOSARTAN AND METOPROLOL        PRINCIPAL DISCHARGE DIAGNOSIS  Diagnosis: NSTEMI (non-ST elevation myocardial infarction)  Assessment and Plan of Treatment: You were diagnosed with a type of heart attack known as an NSTEMI. You were treated with a blood thinner called heparin and plavix your symptoms improved.   PLEASE START TAKING PLAVIX 75mg EVERYDAY      SECONDARY DISCHARGE DIAGNOSES  Diagnosis: CAD (coronary artery disease)  Assessment and Plan of Treatment: You were previously diagnosed with coronary artery disease. This is a narrowing of the arteries in your heart. Please follow up with your PCP and cardiologist for further monitoring. We have changed your medications.   PLEASE CONTINUE TAKING ASPIRIN, CRESTOR , METOPROLOL AND PLAVIX      Diagnosis: HTN (hypertension)  Assessment and Plan of Treatment: You were previously diagnosed with high blood pressure. Please continue on your home medications at this time and follow up with your PMD for further surveillance and management of your high blood pressure.   PLEASE CONTINUE TAKING LOSARTAN AND METOPROLOL       Diagnosis: HLD (hyperlipidemia)  Assessment and Plan of Treatment: Hyperlipidemia, or high cholesterol, is a condition where the levels of fat and/or cholesterol in your blood are high. These elevated blood levels are dangerous because they can lead to issues like heart attacks. It is important that when you leave the hospital you take all your medications as prescribed to control your blood cholesterol levels.  PLEASE CONTINUE TAKING CRESTOR

## 2022-07-28 NOTE — DISCHARGE NOTE PROVIDER - HOSPITAL COURSE
FROM ADMISSION H+P:   HPI:  56-year-old male with past medical history of CAD with stents on aspirin(stents 2009), Hypertension, hyperlipidemia who presents with compliant of chest pain. Patient states he had constant left-sided chest pain that started at 5 PM while sitting on sofa at home.  Patient states pain was radiating down arm and neck.  Patient also complaining of some shortness of breath.  Patient denies worsening symptoms with exertion. Patient states pain has resolved upon interview.     In the ed   VS: T 98.2, HR 88, /101, RR 18, SpO2 98 on RA  Labs: WBC 11.94, troponin 27.2->95.3, glucose 125  EKG: NSR 86  CXR: clear lungs on wet read  Given asa 325, started on heparin gtt per cardiology Dr. Hankins    (27 Jul 2022 22:55)      ---  HOSPITAL COURSE:   Patient admitted for NSTEMI, started on aspirin/plavix. Cardiology consulted Cr recommended started on heparin gtt.   ---  CONSULTANTS:   Cardiology: Dr. Hankins    FROM ADMISSION H+P:   HPI:  56-year-old male with past medical history of CAD with stents on aspirin(stents 2009), Hypertension, hyperlipidemia who presents with compliant of chest pain. Patient states he had constant left-sided chest pain that started at 5 PM while sitting on sofa at home.  Patient states pain was radiating down arm and neck.  Patient also complaining of some shortness of breath.  Patient denies worsening symptoms with exertion. Patient states pain has resolved upon interview.     In the ed   VS: T 98.2, HR 88, /101, RR 18, SpO2 98 on RA  Labs: WBC 11.94, troponin 27.2->95.3, glucose 125  EKG: NSR 86  CXR: clear lungs on wet read  Given asa 325, started on heparin gtt per cardiology Dr. Hankins    (27 Jul 2022 22:55)      ---  HOSPITAL COURSE:   Patient admitted for NSTEMI, started on aspirin/plavix. Cardiology consulted Cr recommended started on heparin gtt. Patient also plavix loaded. Cardiology recommended continue heparin drip and plvaix.  ---  CONSULTANTS:   Cardiology: Dr. Hankins    FROM ADMISSION H+P:   HPI:  56-year-old male with past medical history of CAD with stents on aspirin(stents 2009), Hypertension, hyperlipidemia who presents with compliant of chest pain. Patient states he had constant left-sided chest pain that started at 5 PM while sitting on sofa at home.  Patient states pain was radiating down arm and neck.  Patient also complaining of some shortness of breath.  Patient denies worsening symptoms with exertion. Patient states pain has resolved upon interview. Patient COVID + on presentation     In the ed   VS: T 98.2, HR 88, /101, RR 18, SpO2 98 on RA  Labs: WBC 11.94, troponin 27.2->95.3, glucose 125  EKG: NSR 86  CXR: clear lungs on wet read  Given asa 325, started on heparin gtt per cardiology Dr. Hankins    (27 Jul 2022 22:55)      ---  HOSPITAL COURSE:   Patient admitted for NSTEMI, started on aspirin/plavix. Cardiology consulted Cr recommended started on heparin gtt. Patient also plavix loaded, per cardiology recs. Patient was medically optimized for discharge and was prescribed Plavix to continue outpatient.     PHYSICAL EXAM    T(C): 36.7 (07-29-22 @ 11:11), Max: 36.8 (07-28-22 @ 12:40)  HR: 70 (07-29-22 @ 11:11) (62 - 71)  BP: 115/74 (07-29-22 @ 11:11) (110/74 - 154/94)  RR: 19 (07-29-22 @ 11:11) (18 - 19)  SpO2: 97% (07-29-22 @ 11:11) (95% - 98%)    GENERAL: patient appears well, no acute distress, appropriate, pleasant  EYES: sclera clear, no exudates  ENMT: oropharynx clear without erythema, no exudates, moist mucous membranes  NECK: supple, soft  LUNGS: good air entry bilaterally, clear to auscultation, symmetric breath sounds, no wheezing or rhonchi appreciated  HEART: soft S1/S2, regular rate and rhythm, no murmurs noted, no lower extremity edema  GASTROINTESTINAL: abdomen is soft, nontender, nondistended, normoactive bowel sounds, no palpable masses  INTEGUMENT: good skin turgor, no lesions noted  MUSCULOSKELETAL: no clubbing or cyanosis, no obvious deformity  NEUROLOGIC: awake, alert, oriented x3, good muscle tone in 4 extremities, no obvious sensory deficits  PSYCHIATRIC: mood is good, affect is congruent, linear and logical thought process  HEME/LYMPH:  no obvious ecchymosis or petechiae         CONSULTANTS:   Cardiology: Dr. Hankins    FROM ADMISSION H+P:   HPI:  56-year-old male with past medical history of CAD with stents on aspirin(stents 2009), Hypertension, hyperlipidemia who presents with compliant of chest pain. Patient states he had constant left-sided chest pain that started at 5 PM while sitting on sofa at home.  Patient states pain was radiating down arm and neck.  Patient also complaining of some shortness of breath.  Patient denies worsening symptoms with exertion. Patient states pain has resolved upon interview. Patient COVID + on presentation     In the ed   VS: T 98.2, HR 88, /101, RR 18, SpO2 98 on RA  Labs: WBC 11.94, troponin 27.2->95.3, glucose 125  EKG: NSR 86  CXR: clear lungs on wet read  Given asa 325, started on heparin gtt per cardiology Dr. Hankins    (27 Jul 2022 22:55)      ---  HOSPITAL COURSE:   Patient admitted for NSTEMI, started on aspirin/plavix. Cardiology consulted Dr Hankins recommended pt started on heparin gtt. Patient also plavix loaded. Cardiac enzymes trended down. Chest pain resolved. Pt asymptomatic. Per cardiology pt can complete 48 hours of heparin and then be dsicharged for outpatient ischemic work up.   Patient was medically optimized for discharge and was prescribed Plavix to continue outpatient.     PHYSICAL EXAM    T(C): 36.7 (07-29-22 @ 11:11), Max: 36.8 (07-28-22 @ 12:40)  HR: 70 (07-29-22 @ 11:11) (62 - 71)  BP: 115/74 (07-29-22 @ 11:11) (110/74 - 154/94)  RR: 19 (07-29-22 @ 11:11) (18 - 19)  SpO2: 97% (07-29-22 @ 11:11) (95% - 98%)    GENERAL: patient appears well, no acute distress, appropriate, pleasant  EYES: sclera clear, no exudates  ENMT: oropharynx clear without erythema, no exudates, moist mucous membranes  NECK: supple, soft  LUNGS: good air entry bilaterally, clear to auscultation, symmetric breath sounds, no wheezing or rhonchi appreciated  HEART: soft S1/S2, regular rate and rhythm, no murmurs noted, no lower extremity edema  GASTROINTESTINAL: abdomen is soft, nontender, nondistended, normoactive bowel sounds, no palpable masses  INTEGUMENT: good skin turgor, no lesions noted  MUSCULOSKELETAL: no clubbing or cyanosis, no obvious deformity  NEUROLOGIC: awake, alert, oriented x3, good muscle tone in 4 extremities, no obvious sensory deficits  PSYCHIATRIC: mood is good, affect is congruent, linear and logical thought process  HEME/LYMPH:  no obvious ecchymosis or petechiae         CONSULTANTS:   Cardiology: Dr. Hankins

## 2022-07-28 NOTE — DISCHARGE NOTE PROVIDER - NSDCMRMEDTOKEN_GEN_ALL_CORE_FT
Aspirin Enteric Coated 81 mg oral delayed release tablet: 1 tab(s) orally once a day  Crestor 40 mg oral tablet: 1 tab(s) orally once a day  losartan 50 mg oral tablet: 1 tab(s) orally once a day  metoprolol succinate 50 mg oral tablet, extended release: 1 tab(s) orally once a day   Aspirin Enteric Coated 81 mg oral delayed release tablet: 1 tab(s) orally once a day  losartan 50 mg oral tablet: 1 tab(s) orally once a day  metoprolol succinate 50 mg oral tablet, extended release: 1 tab(s) orally once a day   Aspirin Enteric Coated 81 mg oral delayed release tablet: 1 tab(s) orally once a day  losartan 50 mg oral tablet: 1 tab(s) orally once a day  metoprolol succinate 50 mg oral tablet, extended release: 1 tab(s) orally once a day  Plavix 75 mg oral tablet: 1 tab(s) orally once a day  rosuvastatin 40 mg oral tablet: 1 tab(s) orally once a day

## 2022-07-28 NOTE — CONSULT NOTE ADULT - SUBJECTIVE AND OBJECTIVE BOX
[facility]         Marielena Reyes, Francis, Vinh, Benedict, Cr, Irish        [phone] (office)    Reason for Consult: Chest pain, NSTEMI    Interval HPI: Patient seen and examined at bedside. No acute events overnight. Pt reports chest pain that started yesterday ~4:30 pm while he was sitting and watching TV. The pain was in the left side chest, pressure type, radiated to left upper extremity, 8/10 intensity, non associated to other symptoms.  Pt came to the hospital and the pain resolved after medications in the ED ( mg and heparin 5000 IVP x1). Pt has not presented new episodes of chest pain or other symptoms while in the hospital. He was found to be positive for Covid.  Reports 4-5 days of mild cough and no other symptoms. No previous chest pain, palpitations, SOB, CAMARA, dizziness.    *Last cardiology appointment was ~ 1 year ago in [place], does not remember doctor's name. States last echo and last stress test was more than 2 years ago (before pandemic) and were "normal"       HPI:  56-year-old male with past medical history of CAD with stents on aspirin(stents 2009), Hypertension, hyperlipidemia who presents with compliant of chest pain. Patient states he had constant left-sided chest pain that started at 5 PM while sitting on sofa at home.  Patient states pain was radiating down arm and neck.  Patient also complaining of some shortness of breath.  Patient denies worsening symptoms with exertion. Patient states pain has resolved upon interview.     In the ed   VS: T 98.2, HR 88, /101, RR 18, SpO2 98 on RA  Labs: WBC 11.94, troponin 27.2->95.3, glucose 125  EKG: NSR 86  CXR: clear lungs on wet read  Given asa 325, started on heparin gtt per cardiology Dr. Hankins    (27 Jul 2022 22:55)      PAST MEDICAL & SURGICAL HISTORY:  Kidney Stone    CAD S/P percutaneous coronary angioplasty    Hip Replacement Right-sided (2004), [employer]      SOCIAL HISTORY:   Smoked for 2 years low amount, and quit many years ago.  No alcohol or illicit drug use    FAMILY HISTORY:  No pertinent family history in first degree relatives      Home Medications:  Aspirin Enteric Coated 81 mg oral delayed release tablet: 1 tab(s) orally once a day (27 Jul 2022 23:05)  Crestor 40 mg oral tablet: 1 tab(s) orally once a day (27 Jul 2022 23:05)  losartan 50 mg oral tablet: 1 tab(s) orally once a day (27 Jul 2022 23:05)  metoprolol succinate 50 mg oral tablet, extended release: 1 tab(s) orally once a day (27 Jul 2022 23:05)      MEDICATIONS  (STANDING):  aspirin enteric coated 81 milliGRAM(s) Oral daily  atorvastatin 80 milliGRAM(s) Oral at bedtime  heparin  Infusion.  Unit(s)/Hr (10 mL/Hr) IV Continuous <Continuous>  losartan 50 milliGRAM(s) Oral daily  metoprolol succinate ER 50 milliGRAM(s) Oral daily    MEDICATIONS  (PRN):  acetaminophen     Tablet .. 650 milliGRAM(s) Oral every 6 hours PRN Temp greater or equal to 38C (100.4F), Mild Pain (1 - 3)  heparin   Injectable 5300 Unit(s) IV Push every 6 hours PRN For aPTT less than 40  melatonin 3 milliGRAM(s) Oral at bedtime PRN Insomnia      Allergies  No Known Allergies    Intolerances      REVIEW OF SYSTEMS: Negative except as per HPI.    VITAL SIGNS:   Vital Signs Last 24 Hrs  T(C): 36.7 (28 Jul 2022 05:32), Max: 36.8 (27 Jul 2022 17:36)  T(F): 98.1 (28 Jul 2022 05:32), Max: 98.2 (27 Jul 2022 17:36)  HR: 63 (28 Jul 2022 05:32) (58 - 88)  BP: 128/82 (28 Jul 2022 05:32) (128/82 - 187/101)    RR: 16 (28 Jul 2022 05:32) (16 - 18)  SpO2: 98% (28 Jul 2022 05:32) (98% - 98%)    Parameters below as of 28 Jul 2022 05:32  Patient On (Oxygen Delivery Method): room air    I&O's Summary      PHYSICAL EXAM:  Constitutional: NAD, well-developed, no signs of respiratory distress at RA.   HEENT NC/AT, moist mucous membranes  Pulmonary: Non-labored, breath sounds are clear bilaterally, no wheezing, rales or rhonchi  Cardiovascular: soft +S1,S2, RRR, no murmur noted  Gastrointestinal: Soft, nontender, nondistended, normoactive bowel sounds  Extremities: No peripheral edema, decreased pedal pulses.   Neurological: Alert, oreinted x3, strength and sensitivity are grossly intact  Skin: warm.       LABS: All Labs Reviewed:                        13.2   9.11  )-----------( 304      ( 28 Jul 2022 04:00 )             42.0                         13.5   11.64 )-----------( 326      ( 27 Jul 2022 17:50 )             41.9     28 Jul 2022 08:48    139    |  105    |  12     ----------------------------<  103    4.5     |  30     |  0.78   27 Jul 2022 17:50    139    |  105    |  12     ----------------------------<  125    3.8     |  27     |  0.85     Ca    9.3        28 Jul 2022 08:48  Ca    9.0        27 Jul 2022 17:50    TPro  7.9    /  Alb  3.7    /  TBili  0.6    /  DBili  x      /  AST  35     /  ALT  32     /  AlkPhos  67     28 Jul 2022 08:48  TPro  8.1    /  Alb  3.9    /  TBili  0.5    /  DBili  x      /  AST  25     /  ALT  30     /  AlkPhos  67     27 Jul 2022 17:50    PT/INR - ( 27 Jul 2022 17:50 )   PT: 13.1 sec;   INR: 1.12 ratio         PTT - ( 28 Jul 2022 04:00 )  PTT:64.5 sec  CARDIAC MARKERS ( 28 Jul 2022 04:00 )  x     / x     / 147 U/L / x     / 12.2 ng/mL  CARDIAC MARKERS ( 27 Jul 2022 23:33 )  x     / x     / 119 U/L / x     / 7.7 ng/mL  CARDIAC MARKERS ( 27 Jul 2022 20:15 )  x     / x     / x     / x     / 1.5 ng/mL  CARDIAC MARKERS ( 27 Jul 2022 17:50 )  x     / x     / 102 U/L / x     / 1.2 ng/mL        EKG: NSR, 86 bpm. No ST abnormalities.       RADIOLOGY:     ACC: [id number] EXAM:  XR CHEST PORTABLE URGENT 1V                          PROCEDURE DATE:  07/27/2022      INTERPRETATION:  AP semierect chest on July 27, 2022 at 6:23 PM. This has   chest discomfort.    COMPARISON: None available.    Heart magnified by technique.    Lungs are clear.    IMPRESSION: No acute finding.    --- End of Report ---      BHARGAV RAO MD; Attending Radiologist  This document has been electronically signed. Jul 28 2022  9:12AM

## 2022-07-28 NOTE — CONSULT NOTE ADULT - ATTENDING COMMENTS
known cad  presents with cp and minor ekg abnormalities, with ce elevated consistent with modest nstemi  no recurrent sx overnight  cont asa  heparin for 48h  plavix 600 then 75 daily  echo  if remains with no recurrent sxs and is otherwise stable would be conservative, noting his covid status. given covid pos is not a candidate for cath at this time. if no recurrent sxs and able to ambulate in the hospitcal without angina, in 48h can be considered for dc with outpt ischemic testing     Upon my evaluation, this patient is at high risk for imminent or life threatening deterioration due to nstemi, with associated issues which require my direct attention, intervention, and personal management.  I have personally spent >35 minutes  of critical care time exclusive of time spent on separate billing procedures. This includes review of laboratory data, radiology results, discussion with primary team\patient, and monitoring for potential decompensation Interventions were performed as documented above.

## 2022-07-28 NOTE — DISCHARGE NOTE PROVIDER - PROVIDER TOKENS
PROVIDER:[TOKEN:[[id number]],FOLLOWUP:[2 weeks]],PROVIDER:[TOKEN:[[id number]],FOLLOWUP:[2 weeks],ESTABLISHEDPATIENT:[T]]

## 2022-07-28 NOTE — PROGRESS NOTE ADULT - PROBLEM SELECTOR PLAN 1
- Admit to tele  - Trop I uptrending to 1373 this AM, will trend to peak   - Continue heparin gtt  - patient reports symptom improvement  - continue home asa, interchange for home statin, home bb  - F/U AM lipid panel & echo   - DASH diet  - Cardiology Dr. Hankins consulted, appreciate recs - Admit to tele  - Trop I uptrending to 1373 this AM, will trend to peak   - Continue heparin gtt  - patient reports symptom improvement  - continue home asa, interchange for home statin, home bb  - plavix load   - lipid panel, tte  - DASH diet  - Cardiology Dr. Hankins consulted, appreciate recs

## 2022-07-28 NOTE — PATIENT PROFILE ADULT - BRAND OF FIRST COVID-19 BOOSTER
Spoke with patients wife, Dariana HASMUKH Norton  
[employer]

## 2022-07-28 NOTE — CONSULT NOTE ADULT - ASSESSMENT
Pt is 56 year old male with past medical history of MI, CAD x2 stents in 2009, HTN, HLD, prediabetes, who presents for chest pain. Admitted for NSTEMI. Cardiology consulted.      ------In progress------      # Ischemia / ACS  - Pt with chest pain yesterday pm at home.   - h/o MI and 2 stents in 2009.   - EKG: NSR, 86 bpm. No ST abnormalities.    - Initial troponin normal and uptrend x3 after 2 hs and continue uptrend.   27 < 95 < 555 < 1373   - CKMB uptrend 12 < --- 12.2  - CPK uptrend 1.2 < --- 8.3  - Received  mg x1 and heparin 5000U IVP x1 w/ resolution of pain  - Continue heparin drip, ASA 81mg, metoprolol 50 mg qd and atorvastatin 80mg qhs.   - Telemetry monitor   - Check lipid panel, TSH, A1c.   - NSTEMI likely precipitated by acute Covid infection.     # Arrhytmia   - No h/o cardiac arrythmia   - EKG: NSR  - Tele monitor: no events.  Sinus with rate 50's - 70's.     # Volume status   - Pt with no known h/o CHF or valvular disease.  - No signs or symptoms of volume overload.   - TTE as per pt normal more than 2 years ago.  - TTE ordered.    - Monitor of I&Os,    # Blood pressure   - BP on admission 187/101 in the setting of chest pain   - BP normalized and has been stable. 's - 130's  - At home on losartan 50mg daily and metoprolol 50 mg daily.   - Continue home meds with hold parameters  - Monitor BP            Pt is 56 year old male with past medical history of MI, CAD x2 stents in 2009, HTN, HLD, prediabetes, who presents for chest pain. Admitted for NSTEMI. Cardiology consulted.      ------In progress------      # Ischemia / ACS with NSTEMI   - Pt with chest pain yesterday pm at home.   - h/o MI and 2 stents in 2009.   - EKG on admission NSR, 86 bpm. Mild changes with ST depression in V2  - Initial troponin normal and uptrend x3 after 2 hs and continue uptrend.   27 < 95 < 555 < 1373   - CKMB uptrend 12 < --- 12.2  - CPK uptrend 1.2 < --- 8.3  - Received  mg x1 and heparin 5000U IVP x1 w/ resolution of pain  - Continue heparin drip, ASA 81mg, metoprolol 50 mg qd and atorvastatin 80mg qhs.   - Telemetry monitor   - Check lipid panel, TSH, A1c.   - NSTEMI likely precipitated by acute Covid infection.     # Arrhytmia   - No h/o cardiac arrythmia   - EKG: NSR  - Tele monitor: no events.  Sinus with rate 50's - 70's.     # Volume status   - Pt with no known h/o CHF or valvular disease.  - No signs or symptoms of volume overload.   - TTE as per pt normal more than 2 years ago.  - TTE ordered.    - Monitor of I&Os,    # Blood pressure   - BP on admission 187/101 in the setting of chest pain   - BP normalized and has been stable. 's - 130's  - At home on losartan 50mg daily and metoprolol 50 mg daily.   - Continue home meds with hold parameters  - Monitor BP            Pt is 56 year old male with past medical history of MI, CAD x2 stents in 2009, HTN, HLD, prediabetes, who presents for chest pain. Admitted for NSTEMI. Cardiology consulted.      # Ischemia / Small and stable NSTEMI   - Pt with chest pain yesterday pm at home, resolved after  mg x1 and heparin 5000U IVP x1 in the ED   - h/o MI and 2 stents in 2009.   - EKG on admission NSR, 86 bpm. Mild ST changes AVL, V1 and V2.   Control EKG today am with NSR, 60 bpm, with no ST or T abnormalities   - Initial troponin normal and uptrend  27 < 95 < 555 < 1373 < 1707  - CKMB uptrend 12 < --- 12.2 and CPK uptrend 1.2 < --- 8.3  - Pt needs Plavix 600 mg loading dose and continue 75 mg daily.   - Continue heparin drip, ASA 81mg, metoprolol 50 mg qd and atorvastatin 80mg qhs.  - TTE ordered    - Telemetry monitor   - will f/u lipid panel, A1c.   - Pt is clinically stable, with resolution of chest pain, control EKG with no ST abnormalities and enzymes release has been small. Pt currently positive for Covid infection. At the moment there is not indication for urgent transfer/cath.  If patient presents recurrent chest pain and do not respond to medical treatment, would need urgent cath.      # Blood pressure   - BP on admission 187/101 in the setting of chest pain   - BP normalized and has been stable. 's - 130's  - At home on losartan 50mg daily and metoprolol 50 mg daily.   - Continue home meds with hold parameters  - Monitor BP     # Arrhytmia   - No h/o cardiac arrythmia or Afib.    - EKG: NSR  - Tele monitor: no events. Sinus with rate 50's - 70's.   - No active disease.    # Volume status   - Pt with no known h/o CHF or valvular disease.  - No signs or symptoms of volume overload.   - TTE as per pt normal more than 2 years ago.  - TTE ordered.    - Monitor of I&Os,

## 2022-07-28 NOTE — PROVIDER CONTACT NOTE (CRITICAL VALUE NOTIFICATION) - ACTION/TREATMENT ORDERED:
Dr. Lebron made aware.
Dr. Singh made aware. No new order made. Patient on heparin drip.
Dr. Singh made aware. No new order.

## 2022-07-28 NOTE — DISCHARGE NOTE PROVIDER - CARE PROVIDER_API CALL
Jaymie Hankins)  Internal Medicine  [address] [place] [address]  Phone: [phone]  Fax: [phone]  Follow Up Time: 2 weeks    Adilene Orr  INTERNAL MEDICINE  [address] [place] [id number]  Phone: [phone]  Fax: [phone]  Established Patient  Follow Up Time: 2 weeks

## 2022-07-28 NOTE — PATIENT PROFILE ADULT - FALL HARM RISK - UNIVERSAL INTERVENTIONS
Bed in lowest position, wheels locked, appropriate side rails in place/Call bell, personal items and telephone in reach/Instruct patient to call for assistance before getting out of bed or chair/Non-slip footwear when patient is out of bed/[place] to call system/Physically safe environment - no spills, clutter or unnecessary equipment/Purposeful Proactive Rounding/Room/bathroom lighting operational, light cord in reach

## 2022-07-28 NOTE — DISCHARGE NOTE PROVIDER - CARE PROVIDERS DIRECT ADDRESSES
[email or web address]

## 2022-07-29 ENCOUNTER — TRANSCRIPTION ENCOUNTER (OUTPATIENT)
Age: 57
End: 2022-07-29

## 2022-07-29 VITALS
DIASTOLIC BLOOD PRESSURE: 82 MMHG | HEART RATE: 64 BPM | RESPIRATION RATE: 18 BRPM | SYSTOLIC BLOOD PRESSURE: 129 MMHG | TEMPERATURE: 98 F | OXYGEN SATURATION: 98 %

## 2022-07-29 LAB
A1C WITH ESTIMATED AVERAGE GLUCOSE RESULT: 6.2 % — HIGH (ref 4–5.6)
ANION GAP SERPL CALC-SCNC: 5 MMOL/L — SIGNIFICANT CHANGE UP (ref 5–17)
APTT BLD: 56.5 SEC — HIGH (ref 27.5–35.5)
BASOPHILS # BLD AUTO: 0.03 K/UL — SIGNIFICANT CHANGE UP (ref 0–0.2)
BASOPHILS NFR BLD AUTO: 0.3 % — SIGNIFICANT CHANGE UP (ref 0–2)
BUN SERPL-MCNC: 12 MG/DL — SIGNIFICANT CHANGE UP (ref 7–23)
CALCIUM SERPL-MCNC: 9.5 MG/DL — SIGNIFICANT CHANGE UP (ref 8.5–10.1)
CHLORIDE SERPL-SCNC: 102 MMOL/L — SIGNIFICANT CHANGE UP (ref 96–108)
CO2 SERPL-SCNC: 31 MMOL/L — SIGNIFICANT CHANGE UP (ref 22–31)
CREAT SERPL-MCNC: 0.89 MG/DL — SIGNIFICANT CHANGE UP (ref 0.5–1.3)
EGFR: 99 ML/MIN/1.73M2 — SIGNIFICANT CHANGE UP
EOSINOPHIL # BLD AUTO: 0.24 K/UL — SIGNIFICANT CHANGE UP (ref 0–0.5)
EOSINOPHIL NFR BLD AUTO: 2.5 % — SIGNIFICANT CHANGE UP (ref 0–6)
ESTIMATED AVERAGE GLUCOSE: 131 MG/DL — HIGH (ref 68–114)
GLUCOSE SERPL-MCNC: 97 MG/DL — SIGNIFICANT CHANGE UP (ref 70–99)
HCT VFR BLD CALC: 41.6 % — SIGNIFICANT CHANGE UP (ref 39–50)
HGB BLD-MCNC: 13.6 G/DL — SIGNIFICANT CHANGE UP (ref 13–17)
IMM GRANULOCYTES NFR BLD AUTO: 0.8 % — SIGNIFICANT CHANGE UP (ref 0–1.5)
LYMPHOCYTES # BLD AUTO: 3.13 K/UL — SIGNIFICANT CHANGE UP (ref 1–3.3)
LYMPHOCYTES # BLD AUTO: 32 % — SIGNIFICANT CHANGE UP (ref 13–44)
MCHC RBC-ENTMCNC: 27.3 PG — SIGNIFICANT CHANGE UP (ref 27–34)
MCHC RBC-ENTMCNC: 32.7 GM/DL — SIGNIFICANT CHANGE UP (ref 32–36)
MCV RBC AUTO: 83.4 FL — SIGNIFICANT CHANGE UP (ref 80–100)
MONOCYTES # BLD AUTO: 0.56 K/UL — SIGNIFICANT CHANGE UP (ref 0–0.9)
MONOCYTES NFR BLD AUTO: 5.7 % — SIGNIFICANT CHANGE UP (ref 2–14)
NEUTROPHILS # BLD AUTO: 5.75 K/UL — SIGNIFICANT CHANGE UP (ref 1.8–7.4)
NEUTROPHILS NFR BLD AUTO: 58.7 % — SIGNIFICANT CHANGE UP (ref 43–77)
NRBC # BLD: 0 /100 WBCS — SIGNIFICANT CHANGE UP (ref 0–0)
PLATELET # BLD AUTO: 336 K/UL — SIGNIFICANT CHANGE UP (ref 150–400)
POTASSIUM SERPL-MCNC: 4.5 MMOL/L — SIGNIFICANT CHANGE UP (ref 3.5–5.3)
POTASSIUM SERPL-SCNC: 4.5 MMOL/L — SIGNIFICANT CHANGE UP (ref 3.5–5.3)
RBC # BLD: 4.99 M/UL — SIGNIFICANT CHANGE UP (ref 4.2–5.8)
RBC # FLD: 12.8 % — SIGNIFICANT CHANGE UP (ref 10.3–14.5)
SODIUM SERPL-SCNC: 138 MMOL/L — SIGNIFICANT CHANGE UP (ref 135–145)
WBC # BLD: 9.79 K/UL — SIGNIFICANT CHANGE UP (ref 3.8–10.5)
WBC # FLD AUTO: 9.79 K/UL — SIGNIFICANT CHANGE UP (ref 3.8–10.5)

## 2022-07-29 PROCEDURE — 80061 LIPID PANEL: CPT

## 2022-07-29 PROCEDURE — 80048 BASIC METABOLIC PNL TOTAL CA: CPT

## 2022-07-29 PROCEDURE — 84436 ASSAY OF TOTAL THYROXINE: CPT

## 2022-07-29 PROCEDURE — U0005: CPT

## 2022-07-29 PROCEDURE — 80053 COMPREHEN METABOLIC PANEL: CPT

## 2022-07-29 PROCEDURE — 99232 SBSQ HOSP IP/OBS MODERATE 35: CPT

## 2022-07-29 PROCEDURE — 84443 ASSAY THYROID STIM HORMONE: CPT

## 2022-07-29 PROCEDURE — 82550 ASSAY OF CK (CPK): CPT

## 2022-07-29 PROCEDURE — 85730 THROMBOPLASTIN TIME PARTIAL: CPT

## 2022-07-29 PROCEDURE — 83036 HEMOGLOBIN GLYCOSYLATED A1C: CPT

## 2022-07-29 PROCEDURE — 84484 ASSAY OF TROPONIN QUANT: CPT

## 2022-07-29 PROCEDURE — 86803 HEPATITIS C AB TEST: CPT

## 2022-07-29 PROCEDURE — 93306 TTE W/DOPPLER COMPLETE: CPT

## 2022-07-29 PROCEDURE — 85027 COMPLETE CBC AUTOMATED: CPT

## 2022-07-29 PROCEDURE — 99239 HOSP IP/OBS DSCHRG MGMT >30: CPT | Mod: GC

## 2022-07-29 PROCEDURE — 71045 X-RAY EXAM CHEST 1 VIEW: CPT

## 2022-07-29 PROCEDURE — 99285 EMERGENCY DEPT VISIT HI MDM: CPT | Mod: 25

## 2022-07-29 PROCEDURE — 85610 PROTHROMBIN TIME: CPT

## 2022-07-29 PROCEDURE — 85025 COMPLETE CBC W/AUTO DIFF WBC: CPT

## 2022-07-29 PROCEDURE — 36415 COLL VENOUS BLD VENIPUNCTURE: CPT

## 2022-07-29 PROCEDURE — 82553 CREATINE MB FRACTION: CPT

## 2022-07-29 PROCEDURE — 93005 ELECTROCARDIOGRAM TRACING: CPT

## 2022-07-29 PROCEDURE — U0003: CPT

## 2022-07-29 RX ORDER — ATORVASTATIN CALCIUM 80 MG/1
1 TABLET, FILM COATED ORAL
Qty: 30 | Refills: 0
Start: 2022-07-29 | End: 2022-08-27

## 2022-07-29 RX ORDER — ROSUVASTATIN CALCIUM 5 MG/1
1 TABLET ORAL
Qty: 0 | Refills: 0 | DISCHARGE

## 2022-07-29 RX ORDER — CLOPIDOGREL BISULFATE 75 MG/1
1 TABLET, FILM COATED ORAL
Qty: 30 | Refills: 0
Start: 2022-07-29 | End: 2022-08-27

## 2022-07-29 RX ADMIN — HEPARIN SODIUM 1000 UNIT(S)/HR: 5000 INJECTION INTRAVENOUS; SUBCUTANEOUS at 07:24

## 2022-07-29 RX ADMIN — LOSARTAN POTASSIUM 50 MILLIGRAM(S): 100 TABLET, FILM COATED ORAL at 11:07

## 2022-07-29 RX ADMIN — Medication 81 MILLIGRAM(S): at 11:06

## 2022-07-29 RX ADMIN — CLOPIDOGREL BISULFATE 75 MILLIGRAM(S): 75 TABLET, FILM COATED ORAL at 11:06

## 2022-07-29 RX ADMIN — Medication 50 MILLIGRAM(S): at 11:06

## 2022-07-29 RX ADMIN — HEPARIN SODIUM 1000 UNIT(S)/HR: 5000 INJECTION INTRAVENOUS; SUBCUTANEOUS at 11:05

## 2022-07-29 NOTE — PROGRESS NOTE ADULT - SUBJECTIVE AND OBJECTIVE BOX
[facility] -- Marielena Reyes, Francis, Vinh, Cr Mcmullen Savella  Office # [phone]      Follow Up:    NSTEMI   Subjective/Observations:   No events overnight resting comfortably in bed.  No complaints of chest pain, dyspnea, or palpitations reported. No signs of orthopnea or PND.  remains on room air     REVIEW OF SYSTEMS: All other review of systems is negative unless indicated above    PAST MEDICAL & SURGICAL HISTORY:  Kidney Stone      CAD S/P percutaneous coronary angioplasty      Hip Replacement Right-sided (), winthrop          MEDICATIONS  (STANDING):  aspirin enteric coated 81 milliGRAM(s) Oral daily  atorvastatin 80 milliGRAM(s) Oral at bedtime  clopidogrel Tablet 75 milliGRAM(s) Oral daily  heparin  Infusion.  Unit(s)/Hr (10 mL/Hr) IV Continuous <Continuous>  losartan 50 milliGRAM(s) Oral daily  metoprolol succinate ER 50 milliGRAM(s) Oral daily    MEDICATIONS  (PRN):  acetaminophen     Tablet .. 650 milliGRAM(s) Oral every 6 hours PRN Temp greater or equal to 38C (100.4F), Mild Pain (1 - 3)  heparin   Injectable 5300 Unit(s) IV Push every 6 hours PRN For aPTT less than 40  melatonin 3 milliGRAM(s) Oral at bedtime PRN Insomnia      Allergies    No Known Allergies    Intolerances        Vital Signs Last 24 Hrs  T(C): 36.8 (2022 05:04), Max: 36.8 (2022 12:40)  T(F): 98.2 (2022 05:04), Max: 98.3 (2022 21:02)  HR: 71 (2022 05:04) (62 - 71)  BP: 110/74 (2022 05:04) (110/74 - 154/94)  BP(mean): --  RR: 18 (2022 05:04) (18 - 18)  SpO2: 97% (2022 05:04) (95% - 98%)    Parameters below as of 2022 21:02  Patient On (Oxygen Delivery Method): room air        I&O's Summary        PHYSICAL EXAM:  TELE: Sb 50   Constitutional: NAD, awake and alert, well-developed  HEENT: Moist Mucous Membranes, Anicteric  Pulmonary: Non-labored, breath sounds are clear bilaterally, No wheezing, crackles or rhonchi  Cardiovascular: Regular, S1 and S2 nl, No murmurs, rubs, gallops or clicks  Gastrointestinal: Bowel Sounds present, soft, nontender.   Lymph: No lymphadenopathy. No peripheral edema.  Skin: No visible rashes or ulcers.  Psych:  Mood & affect appropriate    LABS: All Labs Reviewed:                        13.6   9.79  )-----------( 336      ( 2022 08:04 )             41.6                         13.2   9.11  )-----------( 304      ( 2022 04:00 )             42.0                         13.5   11.64 )-----------( 326      ( 2022 17:50 )             41.9     2022 08:04    138    |  102    |  12     ----------------------------<  97     4.5     |  31     |  0.89   2022 08:48    139    |  105    |  12     ----------------------------<  103    4.5     |  30     |  0.78   2022 17:50    139    |  105    |  12     ----------------------------<  125    3.8     |  27     |  0.85     Ca    9.5        2022 08:04  Ca    9.3        2022 08:48  Ca    9.0        2022 17:50    TPro  7.9    /  Alb  3.7    /  TBili  0.6    /  DBili  x      /  AST  35     /  ALT  32     /  AlkPhos  67     2022 08:48  TPro  8.1    /  Alb  3.9    /  TBili  0.5    /  DBili  x      /  AST  25     /  ALT  30     /  AlkPhos  67     2022 17:50    PT/INR - ( 2022 17:50 )   PT: 13.1 sec;   INR: 1.12 ratio         PTT - ( 2022 08:04 )  PTT:56.5 sec  CARDIAC MARKERS ( 2022 14:30 )  x     / x     / 135 U/L / x     / 11.4 ng/mL  CARDIAC MARKERS ( 2022 08:48 )  x     / x     / 158 U/L / x     / 13.8 ng/mL  CARDIAC MARKERS ( 2022 04:00 )  x     / x     / 147 U/L / x     / 12.2 ng/mL  CARDIAC MARKERS ( 2022 23:33 )  x     / x     / 119 U/L / x     / 7.7 ng/mL  CARDIAC MARKERS ( 2022 20:15 )  x     / x     / x     / x     / 1.5 ng/mL  CARDIAC MARKERS ( 2022 17:50 )  x     / x     / 102 U/L / x     / 1.2 ng/mL         EC Lead ECG:   Ventricular Rate 60 BPM    Atrial Rate 60 BPM    P-R Interval 172 ms    QRS Duration 64 ms    Q-T Interval 426 ms    QTC Calculation(Bazett) 426 ms    P Axis 54 degrees    R Axis -17 degrees    T Axis 14 degrees    Diagnosis Line Normal sinus rhythm  Normal ECG  When compared with ECG of 2022 17:41, (Unconfirmed)  ST elevation now present in Anterior leads  T wave inversion now evident in Inferior leads  Confirmed by Tobi Justice MD ([id number]) on 2022 12:50:57 PM (22 @ 05:57)          
Patient is a 56y old  Male who presents with a chief complaint of NSTEMI (29 Jul 2022 10:26)      Subjective:  INTERVAL HPI/OVERNIGHT EVENTS: Patient seen and examined at bedside. No overnight events occurred. Patient has no complaints at this time. Denies fevers, chills, headache, lightheadedness, chest pain, dyspnea, abdominal pain, n/v/d/c.    MEDICATIONS  (STANDING):  aspirin enteric coated 81 milliGRAM(s) Oral daily  atorvastatin 80 milliGRAM(s) Oral at bedtime  clopidogrel Tablet 75 milliGRAM(s) Oral daily  heparin  Infusion.  Unit(s)/Hr (10 mL/Hr) IV Continuous <Continuous>  losartan 50 milliGRAM(s) Oral daily  metoprolol succinate ER 50 milliGRAM(s) Oral daily    MEDICATIONS  (PRN):  acetaminophen     Tablet .. 650 milliGRAM(s) Oral every 6 hours PRN Temp greater or equal to 38C (100.4F), Mild Pain (1 - 3)  heparin   Injectable 5300 Unit(s) IV Push every 6 hours PRN For aPTT less than 40      Allergies    No Known Allergies    Intolerances        REVIEW OF SYSTEMS:  CONSTITUTIONAL: No fever or chills  HEENT:  No headache, no sore throat  RESPIRATORY: No cough, wheezing, or shortness of breath  CARDIOVASCULAR: No chest pain, palpitations  GASTROINTESTINAL: No abd pain, nausea, vomiting, or diarrhea  GENITOURINARY: No dysuria, frequency, or hematuria  NEUROLOGICAL: no focal weakness or dizziness  MUSCULOSKELETAL: no myalgias     Objective:  Vital Signs Last 24 Hrs  T(C): 36.7 (29 Jul 2022 11:11), Max: 36.8 (28 Jul 2022 12:40)  T(F): 98 (29 Jul 2022 11:11), Max: 98.3 (28 Jul 2022 21:02)  HR: 70 (29 Jul 2022 11:11) (62 - 71)  BP: 115/74 (29 Jul 2022 11:11) (110/74 - 154/94)  BP(mean): --  RR: 19 (29 Jul 2022 11:11) (18 - 19)  SpO2: 97% (29 Jul 2022 11:11) (95% - 98%)    Parameters below as of 29 Jul 2022 11:11  Patient On (Oxygen Delivery Method): room air        GENERAL: NAD, lying in bed comfortably  HEAD:  Atraumatic, Normocephalic  EYES: EOMI, conjunctiva and sclera clear  ENT: Moist mucous membranes  NECK: Supple, No JVD  CHEST/LUNG: Clear to auscultation bilaterally; No rales, rhonchi, wheezing, or rubs. Unlabored respirations  HEART: Regular rate and rhythm; No murmurs, rubs, or gallops  ABDOMEN: Bowel sounds present; Soft, Nontender, Nondistended. No hepatomegaly  EXTREMITIES:  2+ Peripheral Pulses, brisk capillary refill. No clubbing, cyanosis, or edema  NERVOUS SYSTEM:  Alert & Oriented X3, speech clear. No deficits   MSK: FROM all 4 extremities, full and equal strength  SKIN: No rashes or lesions    LABS:                        13.6   9.79  )-----------( 336      ( 29 Jul 2022 08:04 )             41.6     CBC Full  -  ( 29 Jul 2022 08:04 )  WBC Count : 9.79 K/uL  Hemoglobin : 13.6 g/dL  Hematocrit : 41.6 %  Platelet Count - Automated : 336 K/uL  Mean Cell Volume : 83.4 fl  Mean Cell Hemoglobin : 27.3 pg  Mean Cell Hemoglobin Concentration : 32.7 gm/dL  Auto Neutrophil # : 5.75 K/uL  Auto Lymphocyte # : 3.13 K/uL  Auto Monocyte # : 0.56 K/uL  Auto Eosinophil # : 0.24 K/uL  Auto Basophil # : 0.03 K/uL  Auto Neutrophil % : 58.7 %  Auto Lymphocyte % : 32.0 %  Auto Monocyte % : 5.7 %  Auto Eosinophil % : 2.5 %  Auto Basophil % : 0.3 %    29 Jul 2022 08:04    138    |  102    |  12     ----------------------------<  97     4.5     |  31     |  0.89     Ca    9.5        29 Jul 2022 08:04      PT/INR - ( 27 Jul 2022 17:50 )   PT: 13.1 sec;   INR: 1.12 ratio         PTT - ( 29 Jul 2022 08:04 )  PTT:56.5 sec    CAPILLARY BLOOD GLUCOSE              RADIOLOGY & ADDITIONAL TESTS:    Personally reviewed.     Consultant(s) Notes Reviewed:  [x] YES  [ ] NO    
Patient is a 56y old  Male who presents with a chief complaint of NSTEMI    Subjective:  INTERVAL HPI/OVERNIGHT EVENTS: Patient seen and examined at bedside. No overnight events occurred. Patient has no complaints at this time. Denies fevers, chills, headache, lightheadedness, chest pain, dyspnea, abdominal pain, n/v/d/c.    MEDICATIONS  (STANDING):  aspirin enteric coated 81 milliGRAM(s) Oral daily  atorvastatin 80 milliGRAM(s) Oral at bedtime  heparin  Infusion.  Unit(s)/Hr (10 mL/Hr) IV Continuous <Continuous>  losartan 50 milliGRAM(s) Oral daily  metoprolol succinate ER 50 milliGRAM(s) Oral daily    MEDICATIONS  (PRN):  acetaminophen     Tablet .. 650 milliGRAM(s) Oral every 6 hours PRN Temp greater or equal to 38C (100.4F), Mild Pain (1 - 3)  heparin   Injectable 5300 Unit(s) IV Push every 6 hours PRN For aPTT less than 40  melatonin 3 milliGRAM(s) Oral at bedtime PRN Insomnia      Allergies    No Known Allergies    Intolerances        REVIEW OF SYSTEMS:  CONSTITUTIONAL: No fever or chills  HEENT:  No headache, no sore throat  RESPIRATORY: No cough, wheezing, or shortness of breath  CARDIOVASCULAR: No chest pain, palpitations  GASTROINTESTINAL: No abd pain, nausea, vomiting, or diarrhea  GENITOURINARY: No dysuria, frequency, or hematuria  NEUROLOGICAL: no focal weakness or dizziness  MUSCULOSKELETAL: no myalgias     Objective:  Vital Signs Last 24 Hrs  T(C): 36.7 (28 Jul 2022 05:32), Max: 36.8 (27 Jul 2022 17:36)  T(F): 98.1 (28 Jul 2022 05:32), Max: 98.2 (27 Jul 2022 17:36)  HR: 63 (28 Jul 2022 05:32) (58 - 88)  BP: 128/82 (28 Jul 2022 05:32) (128/82 - 187/101)  RR: 16 (28 Jul 2022 05:32) (16 - 18)  SpO2: 98% (28 Jul 2022 05:32) (98% - 98%  Patient On (Oxygen Delivery Method): room air        GENERAL: NAD, lying in bed comfortably  HEAD:  Atraumatic, Normocephalic  EYES: EOMI, PERRLA, conjunctiva and sclera clear  ENT: Moist mucous membranes  NECK: Supple, No JVD  CHEST/LUNG: Clear to auscultation bilaterally; No rales, rhonchi, wheezing, or rubs. Unlabored respirations  HEART: Regular rate and rhythm; No murmurs, rubs, or gallops  ABDOMEN: Bowel sounds present; Soft, Nontender, Nondistended. No hepatomegaly  EXTREMITIES:  2+ Peripheral Pulses, brisk capillary refill. No clubbing, cyanosis, or edema  NERVOUS SYSTEM:  Alert & Oriented X3, speech clear. No deficits   MSK: FROM all 4 extremities, full and equal strength  SKIN: No rashes or lesions    LABS:                        13.2   9.11  )-----------( 304      ( 28 Jul 2022 04:00 )             42.0     CBC Full  -  ( 28 Jul 2022 04:00 )  WBC Count : 9.11 K/uL  Hemoglobin : 13.2 g/dL  Hematocrit : 42.0 %  Platelet Count - Automated : 304 K/uL  Mean Cell Volume : 84.0 fl  Mean Cell Hemoglobin : 26.4 pg  Mean Cell Hemoglobin Concentration : 31.4 gm/dL  Auto Neutrophil # : 4.69 K/uL  Auto Lymphocyte # : 3.64 K/uL  Auto Monocyte # : 0.51 K/uL  Auto Eosinophil # : 0.20 K/uL  Auto Basophil # : 0.03 K/uL  Auto Neutrophil % : 51.5 %  Auto Lymphocyte % : 40.0 %  Auto Monocyte % : 5.6 %  Auto Eosinophil % : 2.2 %  Auto Basophil % : 0.3 %    27 Jul 2022 17:50    139    |  105    |  12     ----------------------------<  125    3.8     |  27     |  0.85     Ca    9.0        27 Jul 2022 17:50    TPro  8.1    /  Alb  3.9    /  TBili  0.5    /  DBili  x      /  AST  25     /  ALT  30     /  AlkPhos  67     27 Jul 2022 17:50    PT/INR - ( 27 Jul 2022 17:50 )   PT: 13.1 sec;   INR: 1.12 ratio         PTT - ( 28 Jul 2022 04:00 )  PTT:64.5 sec    CAPILLARY BLOOD GLUCOSE              RADIOLOGY & ADDITIONAL TESTS:    Personally reviewed.     Consultant(s) Notes Reviewed:  [x] YES  [ ] NO

## 2022-07-29 NOTE — DISCHARGE NOTE NURSING/CASE MANAGEMENT/SOCIAL WORK - PATIENT PORTAL LINK FT
You can access the FollowMyHealth Patient Portal offered by [facility] by registering at the following website: [email or web address]. By joining [employer]’s FollowMyHealth portal, you will also be able to view your health information using other applications (apps) compatible with our system.

## 2022-07-29 NOTE — PROGRESS NOTE ADULT - REASON FOR ADMISSION
"Kaden Easton Jr. is a 20 y.o. male who is being evaluated via a billable telephone visit.      The patient has been notified of following:     \"This telephone visit will be conducted via a call between you and your physician/provider. We have found that certain health care needs can be provided without the need for a physical exam.  This service lets us provide the care you need with a short phone conversation.  If a prescription is necessary we can send it directly to your pharmacy.  If lab work is needed we can place an order for that and you can then stop by our lab to have the test done at a later time.    Telephone visits are billed at different rates depending on your insurance coverage. During this emergency period, for some insurers they may be billed the same as an in-person visit.  Please reach out to your insurance provider with any questions.    If during the course of the call the physician/provider feels a telephone visit is not appropriate, you will not be charged for this service.\"    Patient has given verbal consent to a Telephone visit? Yes      Patient would like to receive their AVS by AVS Preference: Mail a copy.    Psychiatric  Out patient Follow Up Progress Note  Date of visit:3/22/2021         Discussion of Care and Treatment Recommendations:   This is a 20 y.o. male with a long history of fetal alcohol spectrum disorder , intermittent explosive disorder, mild Intellectual disability, history of fetal alcohol spectrum and  mood Disorder, due to general medical condition.Pt resides in a group home.      Past psychiatric medication  include :   adder al   Benadryl  Zyprexa -   Abilify   Cogentin       Last visit 02/22/21.  Recommendation at last visit .  1. Continue   - Risperdal  1 mg two  times a day scheduled   -Topamax 50 mg two times a day   -Invega 234 mg Q 4 weeks - last give  7/7/2020- at the group home   -Fluphenazine (prolixin)2.5  mg two times a day    -Atomoxetine   40 mg "
"daily  -Trazodone  150 mg  at HS  Trazodone 50 mg bedtime PRN  -Risperdal 0.5  mg two times a day PRN - aggressive beh   2. RTC : 4 weeks, call in between visits with any  questions or concerns  Patient and I reviewed diagnosis and treatment plan and patient agrees with following recommendations:  Ongoing education given regarding diagnostic and treatment options with adequate verbalization of understanding.  Plan   1. Continue   - Risperdal  1 mg two  times a day scheduled   -Topamax 50 mg two times a day   -Invega 234 mg Q 4 weeks - last give  7/7/2020- at the group home   -Fluphenazine (prolixin)2.5  mg two times a day    -Atomoxetine   40 mg daily  -Trazodone  150 mg  at HS  Trazodone 50 mg bedtime PRN  -Risperdal 0.5  mg two times a day PRN - aggressive beh   2. RTC : 4 weeks, call in between visits with any  questions or concerns         DIagnoses:     Hx Mild Intellectual Disability,   Hx Mood Disorder Due to General Medical Condition   Hx Intermittent Explosive Disorder  Hx Fetal Alcohol Spectrum     Patient Active Problem List   Diagnosis     Fetal alcohol spectrum disorder     Aggressive behavior     Chronic ITP (idiopathic thrombocytopenia) (H)     Mood disorder due to a general medical condition     Encounters for administrative purpose     Epistaxis, recurrent     Psychosis (H)     Mild intellectual disability     Organic mood disorder     Generalized anxiety disorder     Post-splenectomy     Intermittent explosive disorder in adult     Cannabis abuse     Self-injurious behavior     Current smoker     Morbid obesity (H)             Chief Complaint / Subjective:    Chief complaint: \" I am okay \"     History of Present Illness:   Per patient's statement : Patient reports compliance with current medications and denies side effects.  Patient is a poor historian I did speak to staff including nursing staff to get an update on patient status.  Per nursing staff patient is utilizing as needed Resporal at least "
"twice daily.  He has had fewer episodes of agitation while utilizing the as needed in addition to scheduled neuroleptic medications.  Patient states he watches TV all day.  Staff reports that he gets easily irritable with redirection.  Patient does not want to come to the clinic for an assessment of potential side effects from neuroleptic medication.  There is a nursing staff that is on duty and they do monitor potential side effects of neuroleptic medication.  I did request the nurse to send the report and also requested our [facility] staff to fax or mail out a DISCUS evaluation form for nursing staff to complete the assessment and rifaximin back to our clinic.  For right now patient will continue with current medications and return to the clinic in approximately 4 weeks for follow-up appointment and call in between visits with any questions or concerns.         Mental Status Examination: Baseline Hx Mild Intellectual Disability,Fetal Alcohol Spectrum   Orientation: Patient alert and oriented to person, place, time, and situation  Reliability:  Patient appears to be a poor  historian.    Behavior: unable to assess  Speech: Speech is spontaneous and coherent, with a normal rate, rhythm and tone.    Language:There are no difficulties with expressive or receptive language as observed throughout the interview.    Mood: Described as \"I am doing good\" \".    Affect: unable to assess  Judgement: Fair s baseline Hx Mild Intellectual Disability  Insight: Fair -Baseline Hx Mild Intellectual Disability  Gait and station: unable to assess  Thought process: Logical   Hallucinations : No evidence of any hallucination  Thought content: No evidence of delusions or paranoia.   Suicidal /Homical Ideations:  No thoughts of self harm or suicide. No thoughts of harming others.  Associations: Connected  Fund of knowledge: Fair- Baseline Hx Mild Intellectual Disability  Attention / Concentration:Needed redirection and reminders to stay focused "
NSTEMI
  Short Term Memory: Grossly intact as evidence by client recalling themes and ideas discussed.  Long Term Memory: Fair  Motor Status: unable to assess    Drug/treatment history and current pattern of use:   History of marijuana use  Smokes 10 cigarettes daily. Also vapes - not interested in smoking cessation at this time     Medication changes: See Above   Medication adherence: compliant  Medication side effects: absent  Information about medications: Side effects, benefits and alternative treatments discussed and patient agrees .    Psychotherapy: Supportive therapy day-to-day living    Education: Diet, exercise, abstinence from drugs and alcohol, patient will not drive if sedated and medications or  under influence of any substance    Lab Results: Personally reviewed and discussed with the patient    Lab Results   Component Value Date    WBC 9.8 02/17/2021    HGB 13.3 (L) 02/17/2021    HCT 40.3 02/17/2021    PLT 59 (L) 02/17/2021    ALT 28 02/17/2021    AST 22 02/17/2021     02/17/2021    K 3.9 02/17/2021     (H) 02/17/2021    CREATININE 0.83 02/17/2021    BUN 13 02/17/2021    CO2 22 02/17/2021    TSH 2.19 08/26/2020    INR 0.93 11/04/2016    HGBA1C 5.6 02/17/2021       Vital signs:  There were no vitals taken for this visit.  Unable to assess telephone visit  Allergies: Depakote [divalproex] and Other environmental allergy           Review of Systems:      ROS:  Subjective data only - Tele-Health  Visit   10 point ROS was negative except for the items listed in HPI-              Medications:     Current Outpatient Medications on File Prior to Visit   Medication Sig Dispense Refill     ALLERGY RELIEF, LORATADINE, 10 mg tablet TAKE 10MG (1 TABLET) BY MOUTH EVERY DAY 30 tablet 11     atomoxetine (STRATTERA) 40 MG capsule TAKE 40MG (1 CAPSULE) BY MOUTH EVERY DAY.. 30 capsule 11     ferrous sulfate 325 (65 FE) MG tablet TAKE 325MG (1 TABLET) BY MOUTH EVERY DAY WITH BREAKFAST 30 tablet 11     fluPHENAZine 
NSTEMI
(PROLIXIN) 2.5 MG tablet TAKE 2.5MG (1 TABLET) BY MOUTH 2 TIMES A DAY 56 tablet 11     gabapentin (NEURONTIN) 100 MG capsule 100 mg. TAKE 2 CAP (200MG) BY MOUTH 3 TIMES A DAY PRN 20 MINS BEFORE STRESSFUL EVENT       hydrOXYzine pamoate (VISTARIL) 50 MG capsule Take 1 capsule (50 mg total) by mouth 3 (three) times a day as needed for anxiety (Agitation). 60 capsule 0     INVEGA SUSTENNA 234 mg/1.5 mL Syrg IM injection INJECT 1.5ML INTRAMUSCULARLY EVERY 4 WEEKS. 1.5 mL 11     melatonin 3 mg Tab tablet Take 1 tablet (3 mg total) by mouth at bedtime. 30 tablet 0     metFORMIN (GLUCOPHAGE) 500 MG tablet TAKE 500MG (1 TABLET) BY MOUTH 2 TIMES A DAY WITH MEALS. 180 tablet 3     nicotine (NICODERM CQ) 21 mg/24 hr Place 1 patch on the skin daily. 30 patch 0     nicotine polacrilex (NICORETTE) 4 MG gum Apply 1 each (4 mg total) to the mouth or throat every hour as needed for smoking cessation. 380 each 0     paliperidone palmitate (INVEGA SUSTENNA) 234 mg/1.5 mL Syrg IM injection INJECT 1.5ML INTRAMUSCULARLY EVERY 4 WEEKS. 1.5 mL 0     polyethylene glycol (GLYCOLAX) 17 gram/dose powder Take 17 g by mouth daily as needed (constipation).              risperiDONE (RISPERDAL) 0.5 MG tablet TAKE 0.5MG (1 TABLET) BY MOUTH 2 TIMES A DAY AS NEEDED FOR AGGRESIVE BEHAVIOR. 60 tablet 11     risperiDONE (RISPERDAL) 1 MG tablet Take 1 tablet (1 mg total) by mouth 2 (two) times a day. 60 tablet 0     sodium chloride (OCEAN) 0.65 % nasal spray To both nostrils 4 times a day as needed 15 mL 12     TAB-A-TUSHAR per tablet TAKE 1 TABLET BY MOUTH EVERY DAY. 30 tablet 11     topiramate (TOPAMAX) 50 MG tablet TAKE 50MG (1 TABLET) BY MOUTH 2 TIMES A DAY 56 tablet 11     traZODone (DESYREL) 150 MG tablet TAKE 150MG (1 TABLET) BY MOUTH AT BEDTIME 30 tablet 2     traZODone (DESYREL) 50 MG tablet Take 1 tablet (50 mg total) by mouth at bedtime as needed for sleep. 30 tablet 1     No current facility-administered medications on file prior to visit.  
          Coordination of Care:   More than 40 minutes spent on this visit  with more than 50% of time spent on coordination of care including: Educating patient about diagnosis, prognosis, side effects and benefits of medications, diet, exercise.  Time also spent providing supportive therapy regarding above issues.    This note was created using a dictation system. All typing errors or contextual distortion is unintentional and software inherent.    Phone call duration: 35 minutes    Nkechi Monreal NP    
NSTEMI

## 2022-07-29 NOTE — PROGRESS NOTE ADULT - NS ATTEND AMEND GEN_ALL_CORE FT
Patient with stable NSTEMI.  Follow up echo.  Heparin gtt until this evening.  Continue DAPT, statin, bb, and arb.  Outpatient follow up with his cardiologist.  To follow closely while admitted.

## 2022-07-29 NOTE — PROGRESS NOTE ADULT - PROBLEM SELECTOR PLAN 1
-troponin down trended   - Continue heparin gtt x 48 hours   - remains asymptomatic   - continue home asa, interchange for home statin, home bb  - c/w plavix   - lipid panel, tte  - DASH diet  - Cardiology Dr. Hankins consulted, appreciate recs  - dispo: d/c planning this evening after completion of 48 hours of heparin . plan for outpatient ischemic work up

## 2022-07-29 NOTE — PROGRESS NOTE ADULT - TIME BILLING
Pt seen + examined. Case discussed with resident. Agree with assessment and plan above (edited by me in detail):  Time spent: 35min. More than 50% of the visit was spent counseling the patient on medical condition and coordination of care
Pt seen + examined. Case discussed with resident. Agree with assessment and plan above (edited by me in detail):  Time spent: 35min. More than 50% of the visit was spent counseling the patient on medical condition and coordination of care

## 2022-07-29 NOTE — PROGRESS NOTE ADULT - ASSESSMENT
56 year old male with past medical history of MI, CAD x2 stents in 2009, HTN, HLD, prediabetes, who presents for chest pain. Admitted for NSTEMI. Cardiology consulted.        CAD, NSTEMI, MI, HTN, HLD  - Pt with chest pain yesterday pm at home, resolved after  mg x1 and heparin 5000U IVP x1 in the ED   - h/o MI and 2 stents in 2009.   - EKG on admission NSR, 86 bpm. Mild ST changes AVL, V1 and V2.   - Initial troponin normal and uptrend  27 < 95 < 555 < 1373 < 1707  -no recurrent symptoms   - CKMB uptrend 12 < --- 12.2 and CPK uptrend 1.2 < --- 8.3  - sp plavix load now on 75mg daily ,continue ASA 81 mg daily   -on iv heparin dc after 48 hours total  - follow up echo results   -continue losartan and toprol     given active covid and no further chest pain no plans for further ischemic eval at this time   DC planning for outpatient ischemic work up   Monitor and replete electrolytes. Keep K>4.0 and Mg>2.0.  Farida Suárez FNP-C  Cardiology NP  [id number] [phone]                  
56-year-old male with past medical history of CAD with stents on aspirin(stents 2009), Hypertension, hyperlipidemia who presents with compliant of chest pain. Admitted with NSTEMI. 
56-year-old male with past medical history of CAD with stents on aspirin(stents 2009), Hypertension, hyperlipidemia who presents with compliant of chest pain. Admitted with NSTEMI.

## 2022-07-29 NOTE — DISCHARGE NOTE NURSING/CASE MANAGEMENT/SOCIAL WORK - NSDCPEFALRISK_GEN_ALL_CORE
For information on Fall & Injury Prevention, visit: [email or web address] OR  [email or web address] OR  https://www.cdc.gov/steadi/patient.html

## 2022-09-15 PROBLEM — I25.10 ATHEROSCLEROTIC HEART DISEASE OF NATIVE CORONARY ARTERY WITHOUT ANGINA PECTORIS: Chronic | Status: ACTIVE | Noted: 2022-07-27

## 2022-09-28 ENCOUNTER — NON-APPOINTMENT (OUTPATIENT)
Age: 57
End: 2022-09-28

## 2022-09-28 ENCOUNTER — APPOINTMENT (OUTPATIENT)
Dept: COLORECTAL SURGERY | Facility: CLINIC | Age: 57
End: 2022-09-28

## 2022-09-28 VITALS
HEART RATE: 75 BPM | BODY MASS INDEX: 32.44 KG/M2 | RESPIRATION RATE: 14 BRPM | HEIGHT: 64 IN | WEIGHT: 190 LBS | OXYGEN SATURATION: 100 % | TEMPERATURE: 98.2 F

## 2022-09-28 DIAGNOSIS — K64.5 PERIANAL VENOUS THROMBOSIS: ICD-10-CM

## 2022-09-28 DIAGNOSIS — Z86.79 PERSONAL HISTORY OF OTHER DISEASES OF THE CIRCULATORY SYSTEM: ICD-10-CM

## 2022-09-28 PROCEDURE — 46600 DIAGNOSTIC ANOSCOPY SPX: CPT

## 2022-09-28 PROCEDURE — 99204 OFFICE O/P NEW MOD 45 MIN: CPT | Mod: 25

## 2022-09-28 NOTE — HISTORY OF PRESENT ILLNESS
[FreeTextEntry1] : Mr. Mcfadden presents to the office for consultation.  He states that 2 weeks ago, he developed a painful anorectal lump that was associated with a bout of constipation.  Since that time, the lump has resolved and he no longer has any anorectal pain.  He continues to have a residual skin tag at the site.  He also notes that his constipation has since improved.  Last colonoscopy was 1 year prior.

## 2022-09-28 NOTE — ASSESSMENT
[FreeTextEntry1] : Mr. Mcfadden presents to the office with reports of a thrombosed external hemorrhoid that appeared approximately 2 weeks earlier, associated with a bout of constipation.  Since that time, his constipation has resolved and he is no longer experiencing any anorectal pain or swelling from the TE H.  He does note the presence of residual skin at the anal verge.  TEN and anoscopy were otherwise unremarkable.  There is no evidence of residual TE H, and of only skin tags.  I have reassured him that no further treatment was necessary for the skin tags.  He can follow-up with us as needed for any future colorectal issues.

## 2022-09-28 NOTE — PHYSICAL EXAM
[Normal rectal exam] : exam was normal [Reduce Spontaneously] : a spontaneously reducible (grade II) [Skin Tags] : residual hemorrhoidal skin tags were noted [Normal] : was normal [None] : there was no rectal mass  [Gross Blood] : no gross blood [No Rash or Lesion] : No rash or lesion [Alert] : alert [Oriented to Person] : oriented to person [Oriented to Place] : oriented to place [Oriented to Time] : oriented to time [Calm] : calm [de-identified] : Nonedematous circumferential skin tags [de-identified] : No apparent distress [de-identified] : Normocephalic atraumatic [de-identified] : Moving all extremities x4

## 2024-02-28 ENCOUNTER — APPOINTMENT (OUTPATIENT)
Dept: COLORECTAL SURGERY | Facility: CLINIC | Age: 59
End: 2024-02-28
Payer: COMMERCIAL

## 2024-02-28 VITALS
OXYGEN SATURATION: 97 % | BODY MASS INDEX: 35 KG/M2 | HEART RATE: 71 BPM | WEIGHT: 205 LBS | SYSTOLIC BLOOD PRESSURE: 139 MMHG | RESPIRATION RATE: 15 BRPM | HEIGHT: 64 IN | TEMPERATURE: 98.2 F | DIASTOLIC BLOOD PRESSURE: 86 MMHG

## 2024-02-28 PROCEDURE — 46220 EXCISE ANAL EXT TAG/PAPILLA: CPT

## 2024-02-28 PROCEDURE — 99214 OFFICE O/P EST MOD 30 MIN: CPT | Mod: 25

## 2024-02-28 NOTE — ASSESSMENT
[FreeTextEntry1] : Mr. Mcfadden presents to the office with reports of a thrombosed external hemorrhoid that appeared approximately 2 weeks earlier, associated with a bout of constipation.  Since that time, his constipation has resolved and he is no longer experiencing any anorectal pain or swelling from the TE H.  He does note the presence of residual skin at the anal verge.  TEN and anoscopy were otherwise unremarkable.  There is no evidence of residual TE H, and of only skin tags.  I have reassured him that no further treatment was necessary for the skin tags.  He can follow-up with us as needed for any future colorectal issues.  2/28/24 Mr Mcfadden presents to the office with troublesome anal skin tags.  After discussion, he decided he would like the larger of the 2 skin tags removed.  This was performed at the office bedside. The operative site was prepped with Betadine, marked and anesthetized with 1% lidocaine to good effect. Sharp scissors was used to perform the excision and Monsell's was then utilized for hemostasis. Patient was advised to expect post procedure pain for at least one week's time. During this period, she is to avoid significant amount of straining or activity that could result in excess tissue swelling and lead to a recurrence of the external hemorrhoid/skin tag. She was also advised to expect post procedure drainage of the wound as it heals in by secondary intention. Cotton gauze should be placed within the undergarments to prevent soilage, and sitz bath should be performed 2-3 times daily to keep the site wound site clean and facilitate healing.  He is planning to return to the office in 2 to 3 weeks for excision of the other tag.

## 2024-02-28 NOTE — HISTORY OF PRESENT ILLNESS
[FreeTextEntry1] : Mr. Mcfadden presents to the office for consultation.  He states that 2 weeks ago, he developed a painful anorectal lump that was associated with a bout of constipation.  Since that time, the lump has resolved and he no longer has any anorectal pain.  He continues to have a residual skin tag at the site.  He also notes that his constipation has since improved.  Last colonoscopy was 1 year prior.  2/28/24 Mr. Gee returns to the office for followup.  He reports 2 large residual hemorrhoidal skin tags present at that cause discomfort and occasional swelling.  He also notes issues with anorectal hygiene. Would like to have the site excised. He states that his last colonoscopy was 12/2023.

## 2024-02-28 NOTE — PHYSICAL EXAM
[Normal rectal exam] : exam was normal [Skin Tags] : residual hemorrhoidal skin tags were noted [Reduce Spontaneously] : a spontaneously reducible (grade II) [Normal] : was normal [None] : there was no rectal mass  [Gross Blood] : no gross blood [No Rash or Lesion] : No rash or lesion [Alert] : alert [Oriented to Person] : oriented to person [Oriented to Place] : oriented to place [Oriented to Time] : oriented to time [Calm] : calm [de-identified] : No apparent distress [de-identified] : Anterior > posterior skin tag [de-identified] : Normocephalic atraumatic [de-identified] : Moving all extremities x4 Normal vision: sees adequately in most situations; can see medication labels, newsprint

## 2024-03-15 ENCOUNTER — APPOINTMENT (OUTPATIENT)
Dept: COLORECTAL SURGERY | Facility: CLINIC | Age: 59
End: 2024-03-15
Payer: COMMERCIAL

## 2024-03-15 VITALS
OXYGEN SATURATION: 97 % | HEART RATE: 73 BPM | HEIGHT: 64 IN | BODY MASS INDEX: 35 KG/M2 | TEMPERATURE: 97.5 F | WEIGHT: 205 LBS | RESPIRATION RATE: 14 BRPM | SYSTOLIC BLOOD PRESSURE: 147 MMHG | DIASTOLIC BLOOD PRESSURE: 83 MMHG

## 2024-03-15 DIAGNOSIS — K64.4 RESIDUAL HEMORRHOIDAL SKIN TAGS: ICD-10-CM

## 2024-03-15 PROCEDURE — 46220 EXCISE ANAL EXT TAG/PAPILLA: CPT

## 2024-03-15 PROCEDURE — 99214 OFFICE O/P EST MOD 30 MIN: CPT | Mod: 25

## 2024-03-15 NOTE — PHYSICAL EXAM
[Normal rectal exam] : exam was normal [Reduce Spontaneously] : a spontaneously reducible (grade II) [Skin Tags] : residual hemorrhoidal skin tags were noted [Normal] : was normal [None] : there was no rectal mass  [No Rash or Lesion] : No rash or lesion [Alert] : alert [Oriented to Person] : oriented to person [Oriented to Place] : oriented to place [Oriented to Time] : oriented to time [Calm] : calm [Gross Blood] : no gross blood [de-identified] : Anterior skin tag; posterior skin tag site healing well [de-identified] : Normocephalic atraumatic [de-identified] : No apparent distress [de-identified] : Moving all extremities x4

## 2024-03-15 NOTE — ASSESSMENT
[FreeTextEntry1] : Mr. Mcfadden presents to the office with reports of a thrombosed external hemorrhoid that appeared approximately 2 weeks earlier, associated with a bout of constipation.  Since that time, his constipation has resolved and he is no longer experiencing any anorectal pain or swelling from the TE H.  He does note the presence of residual skin at the anal verge.  TEN and anoscopy were otherwise unremarkable.  There is no evidence of residual TE H, and of only skin tags.  I have reassured him that no further treatment was necessary for the skin tags.  He can follow-up with us as needed for any future colorectal issues.  2/28/24 Mr Mcfadden presents to the office with troublesome anal skin tags.  After discussion, he decided he would like the larger of the 2 skin tags removed.  This was performed at the office bedside. The operative site was prepped with Betadine, marked and anesthetized with 1% lidocaine to good effect. Sharp scissors was used to perform the excision and Monsell's was then utilized for hemostasis. Patient was advised to expect post procedure pain for at least one week's time. During this period, she is to avoid significant amount of straining or activity that could result in excess tissue swelling and lead to a recurrence of the external hemorrhoid/skin tag. She was also advised to expect post procedure drainage of the wound as it heals in by secondary intention. Cotton gauze should be placed within the undergarments to prevent soilage, and sitz bath should be performed 2-3 times daily to keep the site wound site clean and facilitate healing.  He is planning to return to the office in 2 to 3 weeks for excision of the other tag.  3/15/24 Cooper presents to the office for excision of his posterior anal skin tag. This was performed at the office bedside. The operative site was prepped with Betadine, marked and anesthetized with 1% lidocaine to good effect. Sharp scissors was used to perform the excision and Monsell's was then utilized for hemostasis. Patient was advised to expect post procedure pain for at least one week's time. During this period, she is to avoid significant amount of straining or activity that could result in excess tissue swelling and lead to a recurrence of the external hemorrhoid/skin tag. She was also advised to expect post procedure drainage of the wound as it heals in by secondary intention. Cotton gauze should be placed within the undergarments to prevent soilage, and sitz bath should be performed 2-3 times daily to keep the site wound site clean and facilitate healing.

## 2024-05-15 ENCOUNTER — APPOINTMENT (OUTPATIENT)
Dept: COLORECTAL SURGERY | Facility: CLINIC | Age: 59
End: 2024-05-15
Payer: COMMERCIAL

## 2024-05-15 VITALS
DIASTOLIC BLOOD PRESSURE: 82 MMHG | OXYGEN SATURATION: 96 % | HEIGHT: 64 IN | WEIGHT: 203 LBS | TEMPERATURE: 97.6 F | SYSTOLIC BLOOD PRESSURE: 130 MMHG | BODY MASS INDEX: 34.66 KG/M2 | HEART RATE: 69 BPM | RESPIRATION RATE: 15 BRPM

## 2024-05-15 DIAGNOSIS — K60.2 ANAL FISSURE, UNSPECIFIED: ICD-10-CM

## 2024-05-15 DIAGNOSIS — K62.89 OTHER SPECIFIED DISEASES OF ANUS AND RECTUM: ICD-10-CM

## 2024-05-15 PROCEDURE — 99214 OFFICE O/P EST MOD 30 MIN: CPT | Mod: 25

## 2024-05-15 RX ORDER — HYDROCORTISONE 25 MG/G
2.5 CREAM TOPICAL
Qty: 30 | Refills: 3 | Status: ACTIVE | COMMUNITY
Start: 2024-05-15 | End: 1900-01-01

## 2024-05-15 RX ORDER — POLYETHYLENE GLYCOL 3350 17 G/17G
17 POWDER, FOR SOLUTION ORAL
Qty: 30 | Refills: 3 | Status: ACTIVE | COMMUNITY
Start: 2024-05-15 | End: 1900-01-01

## 2024-05-15 NOTE — ASSESSMENT
[FreeTextEntry1] : Mr. Mcfadden presents to the office with reports of a thrombosed external hemorrhoid that appeared approximately 2 weeks earlier, associated with a bout of constipation.  Since that time, his constipation has resolved and he is no longer experiencing any anorectal pain or swelling from the TE H.  He does note the presence of residual skin at the anal verge.  TEN and anoscopy were otherwise unremarkable.  There is no evidence of residual TE H, and of only skin tags.  I have reassured him that no further treatment was necessary for the skin tags.  He can follow-up with us as needed for any future colorectal issues.  2/28/24 Mr Mcfadden presents to the office with troublesome anal skin tags.  After discussion, he decided he would like the larger of the 2 skin tags removed.  This was performed at the office bedside. The operative site was prepped with Betadine, marked and anesthetized with 1% lidocaine to good effect. Sharp scissors was used to perform the excision and Monsell's was then utilized for hemostasis. Patient was advised to expect post procedure pain for at least one week's time. During this period, she is to avoid significant amount of straining or activity that could result in excess tissue swelling and lead to a recurrence of the external hemorrhoid/skin tag. She was also advised to expect post procedure drainage of the wound as it heals in by secondary intention. Cotton gauze should be placed within the undergarments to prevent soilage, and sitz bath should be performed 2-3 times daily to keep the site wound site clean and facilitate healing.  He is planning to return to the office in 2 to 3 weeks for excision of the other tag.  3/15/24 Cooper presents to the office for excision of his posterior anal skin tag. This was performed at the office bedside. The operative site was prepped with Betadine, marked and anesthetized with 1% lidocaine to good effect. Sharp scissors was used to perform the excision and Monsell's was then utilized for hemostasis. Patient was advised to expect post procedure pain for at least one week's time. During this period, she is to avoid significant amount of straining or activity that could result in excess tissue swelling and lead to a recurrence of the external hemorrhoid/skin tag. She was also advised to expect post procedure drainage of the wound as it heals in by secondary intention. Cotton gauze should be placed within the undergarments to prevent soilage, and sitz bath should be performed 2-3 times daily to keep the site wound site clean and facilitate healing.  5/15/24 Mr. Mcfadden presents to the office for follow-up secondary to acute onset of an anal fissure.  This was noted anteriorly on physical exam.  I discussed the causes for fissures including constipation, hard stools or loose stools.  Recommended high-fiber diet and stool softener as well as hydrocortisone cream 3 times daily for healing purposes.  Follow-up in 1 month if no improvement.  Patient understands, and is agreeable.

## 2024-05-15 NOTE — PHYSICAL EXAM
[Normal rectal exam] : exam was normal [Anterior] : anteriorly [Reduce Spontaneously] : a spontaneously reducible (grade II) [Skin Tags] : residual hemorrhoidal skin tags were noted [Normal] : was normal [None] : there was no rectal mass  [Gross Blood] : no gross blood [No Rash or Lesion] : No rash or lesion [Alert] : alert [Oriented to Person] : oriented to person [Oriented to Place] : oriented to place [Oriented to Time] : oriented to time [Calm] : calm [de-identified] : Anterior skin tag; posterior skin tag site healing well [de-identified] : No apparent distress [de-identified] : Normocephalic atraumatic [de-identified] : Moving all extremities x4

## 2024-05-15 NOTE — HISTORY OF PRESENT ILLNESS
[FreeTextEntry1] : Mr. Mcfadden presents to the office for consultation.  He states that 2 weeks ago, he developed a painful anorectal lump that was associated with a bout of constipation.  Since that time, the lump has resolved and he no longer has any anorectal pain.  He continues to have a residual skin tag at the site.  He also notes that his constipation has since improved.  Last colonoscopy was 1 year prior.  2/28/24 Mr. Gee returns to the office for followup.  He reports 2 large residual hemorrhoidal skin tags present at that cause discomfort and occasional swelling.  He also notes issues with anorectal hygiene. Would like to have the site excised. He states that his last colonoscopy was 12/2023.  3/15/24 Mr. Gee returns to the office for followup. Reports feeling fine after hemorrhoidal excision.  Still some clear discharge as the site is healing.  Here for additional skin tag removal.  5/15/24 Here for followup.  1 week earlier, he experienced sudden anal rectal pain after bowel movement.  Now he continues with anorectal pain that is present only after defecation.  Denies significant constipation or straining.  Here now for follow-up.

## 2024-10-16 NOTE — CONSULT NOTE ADULT - CONSULT REQUESTED BY NAME
Action Requested: Summary for Provider     []  Critical Lab, Recommendations Needed  [] Need Additional Advice  []   FYI    []   Need Orders  [] Need Medications Sent to Pharmacy  []  Other     SUMMARY: Patient calling, had two episodes of vomiting this morning. With this had tightness in her chest. Has not had BM and she states she usually goes daily. No fever or chills. Constant lower ABD pain that does not subside but burping a lot and discomfort. Was lightheaded this morning. No present now. Not able to keep fluids or food down, drinking water but that is coming back up. Had back pain yesterday.  Advised ER for evaluation.     Patient taking Wegovy, has been seen in UC and k+ was low and given anti nausea meds that are not working for her. WBC elevated yesterday. Patient has had \"vomiting over the last month\".     Patient agreeable to go to ER for evaluation. Will have someone bring her.       Reason for call: Abdominal Pain  Onset: over the last 4 weeks but worse last few days       Reason for Disposition   MILD TO MODERATE constant pain lasting > 2 hours    Protocols used: Abdominal Pain - Female-[facility]-[place]     Dr. Singh

## 2024-10-21 ENCOUNTER — APPOINTMENT (OUTPATIENT)
Dept: ORTHOPEDIC SURGERY | Facility: CLINIC | Age: 59
End: 2024-10-21
Payer: COMMERCIAL

## 2024-10-21 DIAGNOSIS — M25.642 STIFFNESS OF LEFT HAND, NOT ELSEWHERE CLASSIFIED: ICD-10-CM

## 2024-10-21 DIAGNOSIS — S61.211S LACERATION W/OUT FOREIGN BODY OF LEFT INDEX FINGER W/OUT DAMAGE TO NAIL, SEQUELA: ICD-10-CM

## 2024-10-21 PROCEDURE — 99203 OFFICE O/P NEW LOW 30 MIN: CPT

## 2024-10-21 PROCEDURE — 73140 X-RAY EXAM OF FINGER(S): CPT | Mod: LT

## 2025-07-14 ENCOUNTER — APPOINTMENT (OUTPATIENT)
Dept: COLORECTAL SURGERY | Facility: CLINIC | Age: 60
End: 2025-07-14

## 2025-07-18 ENCOUNTER — APPOINTMENT (OUTPATIENT)
Dept: COLORECTAL SURGERY | Facility: CLINIC | Age: 60
End: 2025-07-18
Payer: COMMERCIAL

## 2025-07-18 VITALS
DIASTOLIC BLOOD PRESSURE: 84 MMHG | TEMPERATURE: 98 F | HEIGHT: 64 IN | SYSTOLIC BLOOD PRESSURE: 138 MMHG | OXYGEN SATURATION: 97 % | HEART RATE: 75 BPM | WEIGHT: 202 LBS | RESPIRATION RATE: 14 BRPM | BODY MASS INDEX: 34.49 KG/M2

## 2025-07-18 PROCEDURE — 99214 OFFICE O/P EST MOD 30 MIN: CPT | Mod: 25

## 2025-07-18 PROCEDURE — 46320 REMOVAL OF HEMORRHOID CLOT: CPT
